# Patient Record
Sex: MALE | Race: WHITE | NOT HISPANIC OR LATINO
[De-identification: names, ages, dates, MRNs, and addresses within clinical notes are randomized per-mention and may not be internally consistent; named-entity substitution may affect disease eponyms.]

---

## 2019-05-17 ENCOUNTER — TRANSCRIPTION ENCOUNTER (OUTPATIENT)
Age: 49
End: 2019-05-17

## 2019-09-23 ENCOUNTER — TRANSCRIPTION ENCOUNTER (OUTPATIENT)
Age: 49
End: 2019-09-23

## 2021-02-19 ENCOUNTER — INPATIENT (INPATIENT)
Facility: HOSPITAL | Age: 51
LOS: 3 days | Discharge: HOME | End: 2021-02-23
Attending: INTERNAL MEDICINE | Admitting: INTERNAL MEDICINE
Payer: COMMERCIAL

## 2021-02-19 VITALS
RESPIRATION RATE: 18 BRPM | DIASTOLIC BLOOD PRESSURE: 99 MMHG | SYSTOLIC BLOOD PRESSURE: 210 MMHG | HEART RATE: 94 BPM | TEMPERATURE: 99 F | WEIGHT: 205.03 LBS | OXYGEN SATURATION: 96 %

## 2021-02-19 DIAGNOSIS — Z95.5 PRESENCE OF CORONARY ANGIOPLASTY IMPLANT AND GRAFT: Chronic | ICD-10-CM

## 2021-02-19 LAB
ALBUMIN SERPL ELPH-MCNC: 4.7 G/DL — SIGNIFICANT CHANGE UP (ref 3.5–5.2)
ALP SERPL-CCNC: 87 U/L — SIGNIFICANT CHANGE UP (ref 30–115)
ALT FLD-CCNC: 23 U/L — SIGNIFICANT CHANGE UP (ref 0–41)
ANION GAP SERPL CALC-SCNC: 13 MMOL/L — SIGNIFICANT CHANGE UP (ref 7–14)
APTT BLD: 33.7 SEC — SIGNIFICANT CHANGE UP (ref 27–39.2)
AST SERPL-CCNC: 19 U/L — SIGNIFICANT CHANGE UP (ref 0–41)
BASOPHILS # BLD AUTO: 0.03 K/UL — SIGNIFICANT CHANGE UP (ref 0–0.2)
BASOPHILS NFR BLD AUTO: 0.3 % — SIGNIFICANT CHANGE UP (ref 0–1)
BILIRUB SERPL-MCNC: 0.8 MG/DL — SIGNIFICANT CHANGE UP (ref 0.2–1.2)
BUN SERPL-MCNC: 14 MG/DL — SIGNIFICANT CHANGE UP (ref 10–20)
CALCIUM SERPL-MCNC: 10.2 MG/DL — HIGH (ref 8.5–10.1)
CHLORIDE SERPL-SCNC: 94 MMOL/L — LOW (ref 98–110)
CO2 SERPL-SCNC: 29 MMOL/L — SIGNIFICANT CHANGE UP (ref 17–32)
CREAT SERPL-MCNC: 0.7 MG/DL — SIGNIFICANT CHANGE UP (ref 0.7–1.5)
EOSINOPHIL # BLD AUTO: 0.04 K/UL — SIGNIFICANT CHANGE UP (ref 0–0.7)
EOSINOPHIL NFR BLD AUTO: 0.5 % — SIGNIFICANT CHANGE UP (ref 0–8)
GLUCOSE BLDC GLUCOMTR-MCNC: 295 MG/DL — HIGH (ref 70–99)
GLUCOSE SERPL-MCNC: 269 MG/DL — HIGH (ref 70–99)
HCT VFR BLD CALC: 47.6 % — SIGNIFICANT CHANGE UP (ref 42–52)
HGB BLD-MCNC: 16.6 G/DL — SIGNIFICANT CHANGE UP (ref 14–18)
IMM GRANULOCYTES NFR BLD AUTO: 0.5 % — HIGH (ref 0.1–0.3)
INR BLD: 0.99 RATIO — SIGNIFICANT CHANGE UP (ref 0.65–1.3)
LYMPHOCYTES # BLD AUTO: 1.38 K/UL — SIGNIFICANT CHANGE UP (ref 1.2–3.4)
LYMPHOCYTES # BLD AUTO: 15.6 % — LOW (ref 20.5–51.1)
MCHC RBC-ENTMCNC: 29.7 PG — SIGNIFICANT CHANGE UP (ref 27–31)
MCHC RBC-ENTMCNC: 34.9 G/DL — SIGNIFICANT CHANGE UP (ref 32–37)
MCV RBC AUTO: 85.3 FL — SIGNIFICANT CHANGE UP (ref 80–94)
MONOCYTES # BLD AUTO: 0.56 K/UL — SIGNIFICANT CHANGE UP (ref 0.1–0.6)
MONOCYTES NFR BLD AUTO: 6.3 % — SIGNIFICANT CHANGE UP (ref 1.7–9.3)
NEUTROPHILS # BLD AUTO: 6.79 K/UL — HIGH (ref 1.4–6.5)
NEUTROPHILS NFR BLD AUTO: 76.8 % — HIGH (ref 42.2–75.2)
NRBC # BLD: 0 /100 WBCS — SIGNIFICANT CHANGE UP (ref 0–0)
NT-PROBNP SERPL-SCNC: 115 PG/ML — SIGNIFICANT CHANGE UP (ref 0–300)
PLATELET # BLD AUTO: 201 K/UL — SIGNIFICANT CHANGE UP (ref 130–400)
POTASSIUM SERPL-MCNC: 4.1 MMOL/L — SIGNIFICANT CHANGE UP (ref 3.5–5)
POTASSIUM SERPL-SCNC: 4.1 MMOL/L — SIGNIFICANT CHANGE UP (ref 3.5–5)
PROT SERPL-MCNC: 7.6 G/DL — SIGNIFICANT CHANGE UP (ref 6–8)
PROTHROM AB SERPL-ACNC: 11.4 SEC — SIGNIFICANT CHANGE UP (ref 9.95–12.87)
RBC # BLD: 5.58 M/UL — SIGNIFICANT CHANGE UP (ref 4.7–6.1)
RBC # FLD: 11.7 % — SIGNIFICANT CHANGE UP (ref 11.5–14.5)
SARS-COV-2 RNA SPEC QL NAA+PROBE: SIGNIFICANT CHANGE UP
SODIUM SERPL-SCNC: 136 MMOL/L — SIGNIFICANT CHANGE UP (ref 135–146)
TROPONIN T SERPL-MCNC: <0.01 NG/ML — SIGNIFICANT CHANGE UP
TROPONIN T SERPL-MCNC: <0.01 NG/ML — SIGNIFICANT CHANGE UP
WBC # BLD: 8.84 K/UL — SIGNIFICANT CHANGE UP (ref 4.8–10.8)
WBC # FLD AUTO: 8.84 K/UL — SIGNIFICANT CHANGE UP (ref 4.8–10.8)

## 2021-02-19 PROCEDURE — 99285 EMERGENCY DEPT VISIT HI MDM: CPT

## 2021-02-19 PROCEDURE — 78452 HT MUSCLE IMAGE SPECT MULT: CPT | Mod: 26

## 2021-02-19 PROCEDURE — 71046 X-RAY EXAM CHEST 2 VIEWS: CPT | Mod: 26

## 2021-02-19 PROCEDURE — 99223 1ST HOSP IP/OBS HIGH 75: CPT | Mod: AI

## 2021-02-19 PROCEDURE — 93010 ELECTROCARDIOGRAM REPORT: CPT

## 2021-02-19 PROCEDURE — 93010 ELECTROCARDIOGRAM REPORT: CPT | Mod: 77

## 2021-02-19 RX ORDER — DEXTROSE 50 % IN WATER 50 %
25 SYRINGE (ML) INTRAVENOUS ONCE
Refills: 0 | Status: DISCONTINUED | OUTPATIENT
Start: 2021-02-19 | End: 2021-02-23

## 2021-02-19 RX ORDER — DEXTROSE 50 % IN WATER 50 %
15 SYRINGE (ML) INTRAVENOUS ONCE
Refills: 0 | Status: DISCONTINUED | OUTPATIENT
Start: 2021-02-19 | End: 2021-02-23

## 2021-02-19 RX ORDER — ENOXAPARIN SODIUM 100 MG/ML
40 INJECTION SUBCUTANEOUS AT BEDTIME
Refills: 0 | Status: DISCONTINUED | OUTPATIENT
Start: 2021-02-19 | End: 2021-02-21

## 2021-02-19 RX ORDER — ASPIRIN/CALCIUM CARB/MAGNESIUM 324 MG
325 TABLET ORAL ONCE
Refills: 0 | Status: COMPLETED | OUTPATIENT
Start: 2021-02-19 | End: 2021-02-19

## 2021-02-19 RX ORDER — INSULIN LISPRO 100/ML
VIAL (ML) SUBCUTANEOUS
Refills: 0 | Status: DISCONTINUED | OUTPATIENT
Start: 2021-02-19 | End: 2021-02-23

## 2021-02-19 RX ORDER — INSULIN LISPRO 100/ML
3 VIAL (ML) SUBCUTANEOUS
Refills: 0 | Status: DISCONTINUED | OUTPATIENT
Start: 2021-02-19 | End: 2021-02-22

## 2021-02-19 RX ORDER — SODIUM CHLORIDE 9 MG/ML
1000 INJECTION, SOLUTION INTRAVENOUS
Refills: 0 | Status: DISCONTINUED | OUTPATIENT
Start: 2021-02-19 | End: 2021-02-23

## 2021-02-19 RX ORDER — CLOPIDOGREL BISULFATE 75 MG/1
75 TABLET, FILM COATED ORAL DAILY
Refills: 0 | Status: DISCONTINUED | OUTPATIENT
Start: 2021-02-19 | End: 2021-02-22

## 2021-02-19 RX ORDER — ATORVASTATIN CALCIUM 80 MG/1
40 TABLET, FILM COATED ORAL DAILY
Refills: 0 | Status: DISCONTINUED | OUTPATIENT
Start: 2021-02-19 | End: 2021-02-23

## 2021-02-19 RX ORDER — DEXTROSE 50 % IN WATER 50 %
12.5 SYRINGE (ML) INTRAVENOUS ONCE
Refills: 0 | Status: DISCONTINUED | OUTPATIENT
Start: 2021-02-19 | End: 2021-02-23

## 2021-02-19 RX ORDER — ASPIRIN/CALCIUM CARB/MAGNESIUM 324 MG
81 TABLET ORAL DAILY
Refills: 0 | Status: DISCONTINUED | OUTPATIENT
Start: 2021-02-19 | End: 2021-02-23

## 2021-02-19 RX ORDER — METOPROLOL TARTRATE 50 MG
25 TABLET ORAL
Refills: 0 | Status: DISCONTINUED | OUTPATIENT
Start: 2021-02-19 | End: 2021-02-23

## 2021-02-19 RX ORDER — INSULIN GLARGINE 100 [IU]/ML
10 INJECTION, SOLUTION SUBCUTANEOUS AT BEDTIME
Refills: 0 | Status: DISCONTINUED | OUTPATIENT
Start: 2021-02-19 | End: 2021-02-21

## 2021-02-19 RX ORDER — PANTOPRAZOLE SODIUM 20 MG/1
40 TABLET, DELAYED RELEASE ORAL
Refills: 0 | Status: DISCONTINUED | OUTPATIENT
Start: 2021-02-19 | End: 2021-02-23

## 2021-02-19 RX ORDER — GLUCAGON INJECTION, SOLUTION 0.5 MG/.1ML
1 INJECTION, SOLUTION SUBCUTANEOUS ONCE
Refills: 0 | Status: DISCONTINUED | OUTPATIENT
Start: 2021-02-19 | End: 2021-02-23

## 2021-02-19 RX ADMIN — Medication 25 MILLIGRAM(S): at 21:32

## 2021-02-19 RX ADMIN — ENOXAPARIN SODIUM 40 MILLIGRAM(S): 100 INJECTION SUBCUTANEOUS at 21:32

## 2021-02-19 RX ADMIN — INSULIN GLARGINE 10 UNIT(S): 100 INJECTION, SOLUTION SUBCUTANEOUS at 21:33

## 2021-02-19 RX ADMIN — Medication 325 MILLIGRAM(S): at 15:19

## 2021-02-19 RX ADMIN — ATORVASTATIN CALCIUM 40 MILLIGRAM(S): 80 TABLET, FILM COATED ORAL at 21:33

## 2021-02-19 RX ADMIN — PANTOPRAZOLE SODIUM 40 MILLIGRAM(S): 20 TABLET, DELAYED RELEASE ORAL at 21:41

## 2021-02-19 NOTE — H&P ADULT - NSHPLABSRESULTS_GEN_ALL_CORE
16.6   8.84  )-----------( 201      ( 19 Feb 2021 14:28 )             47.6       02-19    136  |  94<L>  |  14  ----------------------------<  269<H>  4.1   |  29  |  0.7    Ca    10.2<H>      19 Feb 2021 14:28    TPro  7.6  /  Alb  4.7  /  TBili  0.8  /  DBili  x   /  AST  19  /  ALT  23  /  AlkPhos  87  02-19

## 2021-02-19 NOTE — SBIRT NOTE ADULT - NSSBIRTBRIEFINTDET_GEN_A_CORE
Provided SBIRT services: Full screen positive. Referral to Treatment attempted. Screening results were reviewed with the patient and patient was provided information about healthy guidelines and potential negative consequences associated with level of risk. Motivation and readiness to reduce or stop use was discussed and goals and activities to make changes were suggested/offered. Options discussed for further evaluation and treatment, but referral to treatment was not completed because patient wants to cut down/quit on his own. Pt offered but declined a referral for inpatient/outpatient rehab services.

## 2021-02-19 NOTE — ED PROVIDER NOTE - PHYSICAL EXAMINATION
CONSTITUTIONAL: well developed, nontoxic appearing, in no acute distress, speaking in full sentences  SKIN: warm, dry, no rash, cap refill < 2 seconds  HEENT: normocephalic, atraumatic, no conjunctival erythema, moist mucous membranes, patent airway  NECK: supple  CV:  regular rate, regular rhythm, 2+ radial pulses bilaterally  RESP: no wheezes, no rales, no rhonchi, normal work of breathing  ABD: soft, nontender, nondistended, no rebound, no guarding  MSK: normal ROM, no cyanosis, no edema  NEURO: alert, oriented, grossly unremarkable  PSYCH: cooperative, appropriate

## 2021-02-19 NOTE — ED ADULT NURSE NOTE - NSSUHOSCREENINGYN_ED_ALL_ED
Anesthesia Evaluation     .             ROS/MED HX    ENT/Pulmonary:     (+)sleep apnea, uses CPAP , . .   (-) tobacco use   Neurologic:     (+)migraines, CVA date: 2/2017 without deficits    Cardiovascular:     (+) hypertension----. : . . . :. . Previous cardiac testing Echodate:results:Interpretation Summary     A patent foramen ovale is present.  Atrial bi-directional shunt  A contrast injection (Bubble Study) was performed that was mildly positive for  flow across the interatrial septum.  The ascending aorta is Mildly dilated.  The atrial septum is aneurysmal.  The study was technically difficult.  _____________________________________________________________________________  __        BROCK  Versed (6mg) was given intravenously. Fentanyl (100mcg) was given  intravenously. Total sedation time: 21 minutes of continuous bedside 1:1  monitoring. Consent to the procedure was obtained prior to sedation. Prior to  the exam, the oral cavity was checked and no overcrowding was noted. The  transesophageal probe was passed without difficulty. There were no  complications associated with this procedure.     Left Ventricle  The left ventricle is normal in size. The visual ejection fraction is  estimated at 55-60%. Left ventricular systolic function is normal.     Right Ventricle  The right ventricle is normal in size and function.     Atria  The left atrium is mildly dilated. No thrombus is detected in the left atrial  appendage. No left atrial mass or thrombus visualized. Right atrial size is  normal. No evidence of right atrial mass/thrombus. Lipomatous hypertrophy of  the interatrial septum is noted. Left to right atrial shunt, small. Right to  left atrial shunt, small. A contrast injection (Bubble Study) was performed  that was mildly positive for flow across the interatrial septum. A contrast  injection (Bubble Study) was performed that was positive early after the  injection. A patent foramen ovale is present. Atrial  bi-directional shunt. The  atrial septum is aneurysmal.        Mitral Valve  There is trace mitral regurgitation.     Tricuspid Valve  There is mild (1+) tricuspid regurgitation.     Aortic Valve  The aortic valve is trileaflet. There is trace aortic regurgitation. No aortic  stenosis is present.     Pulmonic Valve  There is no pulmonic valvular regurgitation. Normal pulmonic valve velocity.     Vessels  The ascending aorta is Mildly dilated. Normal pulmonary venous drainage.        Pericardial/Pleural  There is no pericardial effusion.     Rhythm  The rhythm was sinus bradycardia.  date: results: date: results: date: results:          METS/Exercise Tolerance:     Hematologic:         Musculoskeletal:         GI/Hepatic:        (-) GERD   Renal/Genitourinary:         Endo:     (+) Obesity, .      Psychiatric:         Infectious Disease:         Malignancy:         Other:                     Physical Exam  Normal systems: cardiovascular, pulmonary and dental    Airway   Mallampati: III  TM distance: >3 FB  Neck ROM: full    Dental     Cardiovascular       Pulmonary                     Anesthesia Plan      History & Physical Review  History and physical reviewed and following examination; no interval change.    ASA Status:  2 .    NPO Status:  > 8 hours    Plan for ETT and General with Intravenous and Propofol induction. Maintenance will be Inhalation.    PONV prophylaxis:  Ondansetron (or other 5HT-3) and Dexamethasone or Solumedrol  Additional equipment: Videolaryngoscope 12.5mg Beandryl, 4mg Decadron and Zofran for PONV Prophy      Postoperative Care  Postoperative pain management:  IV analgesics and Oral pain medications.      Consents  Anesthetic plan, risks, benefits and alternatives discussed with:  Patient..                          .   Yes - the patient is able to be screened

## 2021-02-19 NOTE — H&P ADULT - ASSESSMENT
# chest pain - atypical in nature  DDx:  EKG on admit: no ST changes, Troponin negative x1  - trend cardiac enzymes  - Pain control  - if uptreading CE or change in nature of chest pain or new EKG changes then cardio eval   - s/p loading dose aspirin. Continue aspirin + plavix    # CAD s/p PCI  - on DAPT, BB, statin    #Misc  - DVT Prophylaxis: lovenox  - GI Prophylaxis: pantoprazole 40mg PO QD   - Diet: DASH  - Activity: as tolerated  - Code Status: Full Code    SOCIAL: patient lives alone, walks independently, comfortable with ADLs.    * MED REC COMPLETED WITH PATIENT*         # chest pain - atypical in nature  DDx:  EKG on admit: no ST changes, Troponin negative x1  - trend cardiac enzymes  - Pain control  - Cardiology consult  - s/p loading dose aspirin. Continue aspirin + plavix    # CAD s/p PCI  - on DAPT, BB, statin    #Misc  - DVT Prophylaxis: lovenox  - GI Prophylaxis: pantoprazole 40mg PO QD   - Diet: DASH  - Activity: as tolerated  - Code Status: Full Code    SOCIAL: patient lives alone, walks independently, comfortable with ADLs.    * MED REC COMPLETED WITH PATIENT*   51 year old male  with a PMhx of CAD s/p PCIx2 (last was 10 years ago) on ASA/plavix, HTN, DMII  # chest pain   DDx:  EKG on admit: no ST changes, Troponin negative x1  - trend cardiac enzymes  - Pain control  - Cardiology consult  - s/p loading dose aspirin. Continue aspirin + plavix    # CAD s/p PCI  - on DAPT, BB, statin    #Misc  - DVT Prophylaxis: lovenox  - GI Prophylaxis: pantoprazole 40mg PO QD   - Diet: DASH  - Activity: as tolerated  - Code Status: Full Code    SOCIAL: patient lives alone, walks independently, comfortable with ADLs.    * MED REC COMPLETED WITH PATIENT*   51 year old male  with a PMhx of CAD s/p PCIx2 (last was 10 years ago) on ASA/plavix, HTN, DMII    # chest pain - unstable angina vs GERD  However exacerbated by ambulation   EKG on admit: no ST changes, Troponin negative x1  - trend cardiac enzymes  - Pain control  - Cardiology consult in AM (dr. rutherford)  - will order stress test (patient says he had a stress test 1.5 years ago which was normal)  - s/p loading dose aspirin. Continue aspirin + plavix    # CAD s/p PCI  - on DAPT, BB, statin    # DMII  - Lantus 10U  and lispro 3U TID  - target -180  - f.u hba1c in AM     #Misc  - DVT Prophylaxis: lovenox  - GI Prophylaxis: pantoprazole 40mg PO QD   - Diet: DASH  - Activity: as tolerated  - Code Status: Full Code    SOCIAL: patient  walks independently, comfortable with ADLs.    * MED REC COMPLETED WITH PATIENT*   51 year old male  with a PMhx of CAD s/p PCIx2 (last was 10 years ago) on ASA/plavix, HTN, DMII    # chest pain - unstable angina vs GERD  However exacerbated by ambulation   EKG on admit: no ST changes, Troponin negative x1  - trend cardiac enzymes  - Cardiology consult in AM (dr. rutherford)  - will order stress test (patient says he had a stress test 1.5 years ago which was normal)  - s/p loading dose aspirin. Continue aspirin + plavix  - ekg in AM    # CAD s/p PCI  - on DAPT, BB, statin    # DMII  - Lantus 10U  and lispro 3U TID  - target -180  - f.u hba1c in AM     #Misc  - DVT Prophylaxis: lovenox  - GI Prophylaxis: pantoprazole 40mg PO QD   - Diet: DASH  - Activity: as tolerated  - Code Status: Full Code    SOCIAL: patient  walks independently, comfortable with ADLs.    * MED REC COMPLETED WITH PATIENT*

## 2021-02-19 NOTE — H&P ADULT - ATTENDING COMMENTS
I have performed a history and physical exam of this patient and discussed the management with the resident.  I have reviewed the resident note and agree with the documented findings and plan of care.

## 2021-02-19 NOTE — H&P ADULT - HISTORY OF PRESENT ILLNESS
51 year old male  with a PMhx of CAD s/p PCIx2 on ASA/plavix,     CC:    History goes back to:     ROS is positive for:     ROS is negative for:     Of note:    In the ED: VS significant BP:210/100  Labs significant for: WNL  Troponin: negative x1  EKG:   CXR:   CT head:  51 year old male  with a PMhx of CAD s/p PCIx2 on ASA/plavix,     CC:    History goes back to:     ROS is positive for:     ROS is negative for:     Of note:    In the ED: VS significant BP:210/100  Labs significant for: WNL  Troponin: negative x1  EKG: no acute St changes  CXR: WNL  CT head:  51 year old male  with a PMhx of CAD s/p PCIx2 (last was 10 years ago) on ASA/plavix, HTN, DMII    CC: chest pain     History goes back to: 1 week ago when patient started to have epigastric chest pain, 6/10- 8/10, radiating to his upper chest. Patient reports that pain in intermittent and exacerbated by  walking, relived by resting.   Pain is not associated with food intake, not exacerbated by heavy meals. No associated SOB with chest pain.     ROS is negative for: no fever, no chills, no nausea or vomiting. Patient denies change in urinary or bowel habits. No lower extremity edema/swelling. No anosmia or ageusia.     In the ED: VS significant BP:210/100  Labs significant for: WNL  Troponin: negative x1  EKG: no acute St changes  CXR: WNL  CT head:  51 year old male  with a PMhx of CAD s/p PCIx2 (last was 10 years ago) on ASA/plavix, HTN, DMII    CC: chest pain     History goes back to: 1 week ago when patient started to have epigastric chest pain, 6/10- 8/10, radiating to his upper chest. Patient reports that pain in intermittent and exacerbated by  walking, relived by resting.   Pain is not associated with food intake, not exacerbated by heavy meals. No associated SOB with chest pain.     ROS is negative for: no fever, no chills, no nausea or vomiting. Patient denies change in urinary or bowel habits. No lower extremity edema/swelling. No anosmia or ageusia.     In the ED: VS significant BP:210/100  Labs significant for: WNL  Troponin: negative x1  EKG: no acute St changes  CXR: WNL

## 2021-02-19 NOTE — ED ADULT NURSE NOTE - NSIMPLEMENTINTERV_GEN_ALL_ED
Implemented All Universal Safety Interventions:  [place] to call system. Call bell, personal items and telephone within reach. Instruct patient to call for assistance. Room bathroom lighting operational. Non-slip footwear when patient is off stretcher. Physically safe environment: no spills, clutter or unnecessary equipment. Stretcher in lowest position, wheels locked, appropriate side rails in place.

## 2021-02-19 NOTE — ED PROVIDER NOTE - OBJECTIVE STATEMENT
52 yo M with PMHx of CAD s/p PCIx2 on ASA/plavix, DM, HLD, HTN who presents with intermittent     Cardio: Dr. Rubio 50 yo M with PMHx of CAD s/p PCIx2 on ASA/plavix, DM, HLD, HTN who presents with intermittent, moderate, L sided cp radiating to LUQ/LUE x 1 wk associated with palpitations. Sx occur with exertion, improved with rest. No fever, cough, sob, nausea, vomiting, leg swelling/pain, hx of clots, hormone use. States sx feel similar to when he had PCIx2 placed 10 yrs ago. Had stress test ~1.5 yrs ago.    Cardio: Dr. Rubio

## 2021-02-19 NOTE — ED PROVIDER NOTE - CLINICAL SUMMARY MEDICAL DECISION MAKING FREE TEXT BOX
50 yo male PMH HTN, [id number] cholesterol,  CAD with 2 stents for 10 years c/o sharp intermittent exertional left sided CP radiating to his left arm for about 6 days.  No associated syncope, dizziness, lightheadedness, cough, abdominal pain, black or bloody stools, no leg pain or swelling.  No recent illness  or travel, he reports compliance with meds, does not have a cardiologist.  No pain at present.   Well-appearing well-nourished, NAD, head AT/NC, PERRL, pink conjunctivae,  nml phonation , supple neck without midline spine ttp, nml work of breathing, lungs CTA b/l, equal air entry, RRR, well-perfused extremities, distal pulses intact, abdomen soft, NT/ND, BS present in all quadrants, no midline spine or CVA ttp, no leg edema or unilateral calf swelling, A&Ox3, no focal neuro deficits, nml mood and affect.  ECG is without acute ischemic changes, CRX WNL.  Plan admission for further work up .  Patient is amenable with the plan.

## 2021-02-19 NOTE — ED PROVIDER NOTE - PROGRESS NOTE DETAILS
TC: 52 yo M TC: 50 yo M with PMHx of CAD s/p PCIx2 on ASA/plavix, DM, HLD, HTN who presents with exertional cp x 1 wk. Had "normal" stress test 1.5 yrs ago. Here in ED, vitals wnl. Ordered labs, ekg, cxr. Will reassess. TC: Labs wnl including negative trop. Ekg nonsichemic. Cxr negative. HEART score 4 (1+ hx, 1+ age, 2+ risk factors). KELLY score 4. Given ASA 325mg. Pt without active cp at this time.

## 2021-02-19 NOTE — ED PROVIDER NOTE - NS ED ROS FT
GEN:  no fever, no chills, no generalized weakness  NEURO:  no headache, no dizziness  ENT: no sore throat, no runny nose  CV:  + chest pain, + palpitations  RESP:  no sob, no cough  GI:  no nausea, no vomiting, no abdominal pain, no diarrhea  :  no dysuria, no urinary frequency, no hematuria  MSK:  no joint pain, no edema  SKIN:  no rash, no bruising  HEME: no hematochezia, no melena

## 2021-02-19 NOTE — ED ADULT TRIAGE NOTE - NS ED TRIAGE EKG FT
Detail Level: Zone Recommendation Preamble: The following recommendations were made during the visit: Dr. Farooq’s wart pads and 40% Mediplast by Pb Dr Kaur

## 2021-02-19 NOTE — H&P ADULT - NSHPPHYSICALEXAM_GEN_ALL_CORE
Physical Exam:  General: NAD,   Neurology: A&Ox3, nonfocal, follows commands  Eyes: PERRLA/ EOMI  ENT/Neck: Neck supple, trachea midline, No JVD  Respiratory: B/L basilar rales, No wheezing, rhonchi  Cardiovascular: Normal rate regular rhythm, S1S2, no murmurs, rubs or gallops  Abdominal: Soft, non-tender, non-distended  Extremities: no pedal edema, + peripheral pulses  Musculoskeletal: 5/5 BUE and BLE muscle strength  Skin: No Rashes, Hematoma, Ecchymosis

## 2021-02-19 NOTE — ED PROVIDER NOTE - ATTENDING CONTRIBUTION TO CARE
50 yo male PMH HTN, [id number] cholesterol,  CAD with 2 stents for 10 years c/o sharp intermittent exertional left sided CP radiating to his left arm for about 6 days.  No associated syncope, dizziness, lightheadedness, cough, abdominal pain, black or bloody stools, no leg pain or swelling.  No recent illness  or travel, he reports compliance with meds, does not have a cardiologist.  No pain at present.   Well-appearing well-nourished, NAD, head AT/NC, PERRL, pink conjunctivae,  nml phonation , supple neck without midline spine ttp, nml work of breathing, lungs CTA b/l, equal air entry, RRR, well-perfused extremities, distal pulses intact, abdomen soft, NT/ND, BS present in all quadrants, no midline spine or CVA ttp, no leg edema or unilateral calf swelling, A&Ox3, no focal neuro deficits, nml mood and affect.  ECG is without acute ischemic changes, CRX WNL.  Plan admission for further work up .  Patient is amenable with the plan.

## 2021-02-20 DIAGNOSIS — E78.00 PURE HYPERCHOLESTEROLEMIA, UNSPECIFIED: ICD-10-CM

## 2021-02-20 DIAGNOSIS — R07.9 CHEST PAIN, UNSPECIFIED: ICD-10-CM

## 2021-02-20 DIAGNOSIS — I10 ESSENTIAL (PRIMARY) HYPERTENSION: ICD-10-CM

## 2021-02-20 DIAGNOSIS — E11.9 TYPE 2 DIABETES MELLITUS WITHOUT COMPLICATIONS: ICD-10-CM

## 2021-02-20 LAB
A1C WITH ESTIMATED AVERAGE GLUCOSE RESULT: 9.3 % — HIGH (ref 4–5.6)
ALBUMIN SERPL ELPH-MCNC: 4 G/DL — SIGNIFICANT CHANGE UP (ref 3.5–5.2)
ALP SERPL-CCNC: 74 U/L — SIGNIFICANT CHANGE UP (ref 30–115)
ALT FLD-CCNC: 18 U/L — SIGNIFICANT CHANGE UP (ref 0–41)
ANION GAP SERPL CALC-SCNC: 10 MMOL/L — SIGNIFICANT CHANGE UP (ref 7–14)
AST SERPL-CCNC: 14 U/L — SIGNIFICANT CHANGE UP (ref 0–41)
BASOPHILS # BLD AUTO: 0.03 K/UL — SIGNIFICANT CHANGE UP (ref 0–0.2)
BASOPHILS NFR BLD AUTO: 0.4 % — SIGNIFICANT CHANGE UP (ref 0–1)
BILIRUB SERPL-MCNC: 0.6 MG/DL — SIGNIFICANT CHANGE UP (ref 0.2–1.2)
BUN SERPL-MCNC: 17 MG/DL — SIGNIFICANT CHANGE UP (ref 10–20)
CALCIUM SERPL-MCNC: 9.2 MG/DL — SIGNIFICANT CHANGE UP (ref 8.5–10.1)
CHLORIDE SERPL-SCNC: 101 MMOL/L — SIGNIFICANT CHANGE UP (ref 98–110)
CHOLEST SERPL-MCNC: 116 MG/DL — SIGNIFICANT CHANGE UP
CO2 SERPL-SCNC: 27 MMOL/L — SIGNIFICANT CHANGE UP (ref 17–32)
CREAT SERPL-MCNC: 0.8 MG/DL — SIGNIFICANT CHANGE UP (ref 0.7–1.5)
EOSINOPHIL # BLD AUTO: 0.1 K/UL — SIGNIFICANT CHANGE UP (ref 0–0.7)
EOSINOPHIL NFR BLD AUTO: 1.3 % — SIGNIFICANT CHANGE UP (ref 0–8)
ESTIMATED AVERAGE GLUCOSE: 220 MG/DL — HIGH (ref 68–114)
GLUCOSE BLDC GLUCOMTR-MCNC: 226 MG/DL — HIGH (ref 70–99)
GLUCOSE BLDC GLUCOMTR-MCNC: 241 MG/DL — HIGH (ref 70–99)
GLUCOSE BLDC GLUCOMTR-MCNC: 278 MG/DL — HIGH (ref 70–99)
GLUCOSE BLDC GLUCOMTR-MCNC: 298 MG/DL — HIGH (ref 70–99)
GLUCOSE SERPL-MCNC: 186 MG/DL — HIGH (ref 70–99)
HCT VFR BLD CALC: 45 % — SIGNIFICANT CHANGE UP (ref 42–52)
HDLC SERPL-MCNC: 31 MG/DL — LOW
HGB BLD-MCNC: 15.4 G/DL — SIGNIFICANT CHANGE UP (ref 14–18)
IMM GRANULOCYTES NFR BLD AUTO: 0.4 % — HIGH (ref 0.1–0.3)
LIPID PNL WITH DIRECT LDL SERPL: 72 MG/DL — SIGNIFICANT CHANGE UP
LYMPHOCYTES # BLD AUTO: 2.55 K/UL — SIGNIFICANT CHANGE UP (ref 1.2–3.4)
LYMPHOCYTES # BLD AUTO: 33.1 % — SIGNIFICANT CHANGE UP (ref 20.5–51.1)
MAGNESIUM SERPL-MCNC: 1.8 MG/DL — SIGNIFICANT CHANGE UP (ref 1.8–2.4)
MCHC RBC-ENTMCNC: 29.5 PG — SIGNIFICANT CHANGE UP (ref 27–31)
MCHC RBC-ENTMCNC: 34.2 G/DL — SIGNIFICANT CHANGE UP (ref 32–37)
MCV RBC AUTO: 86.2 FL — SIGNIFICANT CHANGE UP (ref 80–94)
MONOCYTES # BLD AUTO: 0.7 K/UL — HIGH (ref 0.1–0.6)
MONOCYTES NFR BLD AUTO: 9.1 % — SIGNIFICANT CHANGE UP (ref 1.7–9.3)
NEUTROPHILS # BLD AUTO: 4.29 K/UL — SIGNIFICANT CHANGE UP (ref 1.4–6.5)
NEUTROPHILS NFR BLD AUTO: 55.7 % — SIGNIFICANT CHANGE UP (ref 42.2–75.2)
NON HDL CHOLESTEROL: 85 MG/DL — SIGNIFICANT CHANGE UP
NRBC # BLD: 0 /100 WBCS — SIGNIFICANT CHANGE UP (ref 0–0)
PLATELET # BLD AUTO: 174 K/UL — SIGNIFICANT CHANGE UP (ref 130–400)
POTASSIUM SERPL-MCNC: 4.1 MMOL/L — SIGNIFICANT CHANGE UP (ref 3.5–5)
POTASSIUM SERPL-SCNC: 4.1 MMOL/L — SIGNIFICANT CHANGE UP (ref 3.5–5)
PROT SERPL-MCNC: 6.7 G/DL — SIGNIFICANT CHANGE UP (ref 6–8)
RBC # BLD: 5.22 M/UL — SIGNIFICANT CHANGE UP (ref 4.7–6.1)
RBC # FLD: 11.7 % — SIGNIFICANT CHANGE UP (ref 11.5–14.5)
SODIUM SERPL-SCNC: 138 MMOL/L — SIGNIFICANT CHANGE UP (ref 135–146)
TRIGL SERPL-MCNC: 175 MG/DL — HIGH
TROPONIN T SERPL-MCNC: <0.01 NG/ML — SIGNIFICANT CHANGE UP
WBC # BLD: 7.7 K/UL — SIGNIFICANT CHANGE UP (ref 4.8–10.8)
WBC # FLD AUTO: 7.7 K/UL — SIGNIFICANT CHANGE UP (ref 4.8–10.8)

## 2021-02-20 PROCEDURE — 99233 SBSQ HOSP IP/OBS HIGH 50: CPT

## 2021-02-20 PROCEDURE — 93016 CV STRESS TEST SUPVJ ONLY: CPT

## 2021-02-20 PROCEDURE — 93018 CV STRESS TEST I&R ONLY: CPT

## 2021-02-20 RX ORDER — REGADENOSON 0.08 MG/ML
0.4 INJECTION, SOLUTION INTRAVENOUS ONCE
Refills: 0 | Status: DISCONTINUED | OUTPATIENT
Start: 2021-02-20 | End: 2021-02-20

## 2021-02-20 RX ORDER — REGADENOSON 0.08 MG/ML
0.4 INJECTION, SOLUTION INTRAVENOUS ONCE
Refills: 0 | Status: DISCONTINUED | OUTPATIENT
Start: 2021-02-20 | End: 2021-02-23

## 2021-02-20 RX ADMIN — ATORVASTATIN CALCIUM 40 MILLIGRAM(S): 80 TABLET, FILM COATED ORAL at 11:50

## 2021-02-20 RX ADMIN — Medication 25 MILLIGRAM(S): at 06:03

## 2021-02-20 RX ADMIN — CLOPIDOGREL BISULFATE 75 MILLIGRAM(S): 75 TABLET, FILM COATED ORAL at 11:50

## 2021-02-20 RX ADMIN — Medication 3: at 17:17

## 2021-02-20 RX ADMIN — Medication 25 MILLIGRAM(S): at 17:18

## 2021-02-20 RX ADMIN — INSULIN GLARGINE 10 UNIT(S): 100 INJECTION, SOLUTION SUBCUTANEOUS at 22:24

## 2021-02-20 RX ADMIN — Medication 3 UNIT(S): at 11:47

## 2021-02-20 RX ADMIN — Medication 3 UNIT(S): at 17:17

## 2021-02-20 RX ADMIN — ENOXAPARIN SODIUM 40 MILLIGRAM(S): 100 INJECTION SUBCUTANEOUS at 22:25

## 2021-02-20 RX ADMIN — Medication 81 MILLIGRAM(S): at 11:49

## 2021-02-20 RX ADMIN — Medication 2: at 11:49

## 2021-02-20 NOTE — CONSULT NOTE ADULT - PROBLEM SELECTOR RECOMMENDATION 9
pt with large area of ischemia with nl ef on lexiscan.  Will discuss with DR. Rubio for possible cardiac cath on Monday  cont asa/plavix/metoprolol  and statin  after cath consider ace/arb with dm and cad  if has more sxs then therapeutic lovenox and upgrade to ccu until then maintain on telemetry

## 2021-02-20 NOTE — CONSULT NOTE ADULT - SUBJECTIVE AND OBJECTIVE BOX
Patient is a 51y old  Male who presents with a chief complaint of chest pain (20 Feb 2021 12:59)      HPI:  51 year old male  with a PMhx of CAD s/p PCIx2 (last was 10 years ago) on ASA/plavix, HTN, DMII    CC: chest pain     History goes back to: 1 week ago when patient started to have epigastric chest pain, 6/10- 8/10, radiating to his upper chest. Patient reports that pain in intermittent and exacerbated by  walking, relived by resting.   Pain is not associated with food intake, not exacerbated by heavy meals. No associated SOB with chest pain.     ROS is negative for: no fever, no chills, no nausea or vomiting. Patient denies change in urinary or bowel habits. No lower extremity edema/swelling. No anosmia or ageusia.     In the ED: VS significant BP:210/100  Labs significant for: WNL  Troponin: negative x3  EKG: no acute St changes  CXR: WNL   (19 Feb 2021 17:33)      PAST MEDICAL & SURGICAL HISTORY:  High cholesterol    HTN (hypertension)    DM (diabetes mellitus)    CAD (coronary artery disease)    S/P coronary artery stent placement        PREVIOUS DIAGNOSTIC TESTING:      STRESS TEST  FINDINGS:Impression:  1. Large-size reversible defect in the apex and anteroseptal wall of the left ventricle consistent with ischemia.    2. Normal left ventricular wall motion and wall thickening.    3. Left ventricular ejection fraction calculated is 54% which is within the range of normal          MEDICATIONS  (STANDING):  aspirin  chewable 81 milliGRAM(s) Oral daily  atorvastatin Oral Tab/Cap - Peds 40 milliGRAM(s) Oral daily  clopidogrel Tablet 75 milliGRAM(s) Oral daily  dextrose 40% Gel 15 Gram(s) Oral once  dextrose 5%. 1000 milliLiter(s) (50 mL/Hr) IV Continuous <Continuous>  dextrose 5%. 1000 milliLiter(s) (100 mL/Hr) IV Continuous <Continuous>  dextrose 50% Injectable 25 Gram(s) IV Push once  dextrose 50% Injectable 12.5 Gram(s) IV Push once  dextrose 50% Injectable 25 Gram(s) IV Push once  enoxaparin Injectable 40 milliGRAM(s) SubCutaneous at bedtime  glucagon  Injectable 1 milliGRAM(s) IntraMuscular once  insulin glargine Injectable (LANTUS) 10 Unit(s) SubCutaneous at bedtime  insulin lispro (ADMELOG) corrective regimen sliding scale   SubCutaneous three times a day before meals  insulin lispro Injectable (ADMELOG) 3 Unit(s) SubCutaneous three times a day before meals  metoprolol tartrate 25 milliGRAM(s) Oral two times a day  pantoprazole    Tablet 40 milliGRAM(s) Oral before breakfast  regadenoson Injectable 0.4 milliGRAM(s) IV Push once    MEDICATIONS  (PRN):  aluminum hydroxide/magnesium hydroxide/simethicone Suspension 30 milliLiter(s) Oral every 6 hours PRN Dyspepsia      FAMILY HISTORY: non contributory      SOCIAL HISTORY:  CIGARETTES:  ALCOHOL:  DRUGS:                      REVIEW OF SYSTEMS:  CONSTITUTIONAL: No distress, Looks stable  NECK: No pain or stiffness  RESPIRATORY: No cough, wheezing, shortness of breath  CARDIOVASCULAR: No chest pain, SOB, palpitations, leg swelling  GASTROINTESTINAL: No abdominal or epigastric pain. No nausea, vomiting, or hematemesis;  No melena.  NEUROLOGICAL: No dizziness, headaches, memory loss, loss of strength  SKIN: No itching, burning, rashes, or lesions   ENDOCRINE: No heat or cold intolerance  MUSCULOSKELETAL: No joint pain, No  swelling; No muscle pain  PSYCHIATRIC: No depression, anxiety, mood swings, or difficulty sleeping  ALLERGY: No hives, itching, rash          Vital Signs Last 24 Hrs  T(C): 36.7 (20 Feb 2021 05:18), Max: 36.7 (20 Feb 2021 05:18)  T(F): 98 (20 Feb 2021 05:18), Max: 98 (20 Feb 2021 05:18)  HR: 70 (20 Feb 2021 05:18) (70 - 84)  BP: 133/62 (20 Feb 2021 05:18) (124/76 - 138/82)  BP(mean): --  RR: 18 (20 Feb 2021 05:18) (18 - 19)  SpO2: 98% (19 Feb 2021 23:48) (98% - 98%)                      PHYSICAL EXAM:  GENERAL: No distress, well developed  HEAD:  Atraumatic, Normocephalic  NECK: Supple, No JVD, No Bruit of either carotid arteries  NERVOUS SYSTEM:  Alert, Awake, Oriented to time, place, person; Normal memory and speech; Normal motor Strength 5/5 B/L upper and lower extremities  CHEST/LUNG: Normal air entry to lung base bilaterally; No wheeze, crackle, rales, rhonchi  HEART: Regular heart beat, S1, A2, P2, No S3, No S4, No gallop, No murmur  ABDOMEN: Soft, Non tender, Non distended; Bowel sounds present  EXTREMITIES:  2+ Peripheral Pulses, No clubbing, No edema  SKIN: No rashes or lesions    TELEMETRY: nsr    ECG:  Diagnosis Line Normal sinus rhythm  Poor R wave progression  Normal ECG    CxrayImpression:    No radiographic evidence of acute cardiopulmonary disease.    I&O's Detail    19 Feb 2021 07:01  -  20 Feb 2021 07:00  --------------------------------------------------------  IN:    Oral Fluid: 50 mL  Total IN: 50 mL    OUT:  Total OUT: 0 mL    Total NET: 50 mL      20 Feb 2021 07:01  -  20 Feb 2021 13:22  --------------------------------------------------------  IN:    Oral Fluid: 340 mL  Total IN: 340 mL    OUT:    Voided (mL): 300 mL  Total OUT: 300 mL    Total NET: 40 mL          LABS:                        15.4   7.70  )-----------( 174      ( 20 Feb 2021 05:05 )             45.0     02-20    138  |  101  |  17  ----------------------------<  186<H>  4.1   |  27  |  0.8    Ca    9.2      20 Feb 2021 05:05  Mg     1.8     02-20    TPro  6.7  /  Alb  4.0  /  TBili  0.6  /  DBili  x   /  AST  14  /  ALT  18  /  AlkPhos  74  02-20    CARDIAC MARKERS ( 20 Feb 2021 05:05 )  x     / <0.01 ng/mL / x     / x     / x      CARDIAC MARKERS ( 19 Feb 2021 21:39 )  x     / <0.01 ng/mL / x     / x     / x      CARDIAC MARKERS ( 19 Feb 2021 14:28 )  x     / <0.01 ng/mL / x     / x     / x          PT/INR - ( 19 Feb 2021 14:28 )   PT: 11.40 sec;   INR: 0.99 ratio         PTT - ( 19 Feb 2021 14:28 )  PTT:33.7 sec    I&O's Summary    19 Feb 2021 07:01  -  20 Feb 2021 07:00  --------------------------------------------------------  IN: 50 mL / OUT: 0 mL / NET: 50 mL    20 Feb 2021 07:01  -  20 Feb 2021 13:22  --------------------------------------------------------  IN: 340 mL / OUT: 300 mL / NET: 40 mL        RADIOLOGY & ADDITIONAL STUDIES:

## 2021-02-20 NOTE — PROGRESS NOTE ADULT - SUBJECTIVE AND OBJECTIVE BOX
INTERVAL HPI/OVERNIGHT EVENTS:    SUBJECTIVE: Patient seen and examined at bedside.     no sob, abd pain, fever  cp midsternal radiating to L chest/ shoulder, worsen with exertion, lasting 30 min, occurs randomly    OBJECTIVE:    VITAL SIGNS:  Vital Signs Last 24 Hrs  T(C): 36.7 (20 Feb 2021 05:18), Max: 36.7 (20 Feb 2021 05:18)  T(F): 98 (20 Feb 2021 05:18), Max: 98 (20 Feb 2021 05:18)  HR: 70 (20 Feb 2021 05:18) (70 - 84)  BP: 133/62 (20 Feb 2021 05:18) (124/76 - 138/82)  BP(mean): --  RR: 18 (20 Feb 2021 05:18) (18 - 19)  SpO2: 98% (19 Feb 2021 23:48) (98% - 98%)      PHYSICAL EXAM:    General: NAD  HEENT: NC/AT; PERRL, clear conjunctiva  Neck: supple  Respiratory: CTA b/l  Cardiovascular: +S1/S2; RRR  Abdomen: soft, NT/ND; +BS x4  Extremities: WWP, 2+ peripheral pulses b/l; no LE edema  Skin: normal color and turgor; no rash  Neurological:    MEDICATIONS:  MEDICATIONS  (STANDING):  aspirin  chewable 81 milliGRAM(s) Oral daily  atorvastatin Oral Tab/Cap - Peds 40 milliGRAM(s) Oral daily  clopidogrel Tablet 75 milliGRAM(s) Oral daily  dextrose 40% Gel 15 Gram(s) Oral once  dextrose 5%. 1000 milliLiter(s) (50 mL/Hr) IV Continuous <Continuous>  dextrose 5%. 1000 milliLiter(s) (100 mL/Hr) IV Continuous <Continuous>  dextrose 50% Injectable 25 Gram(s) IV Push once  dextrose 50% Injectable 12.5 Gram(s) IV Push once  dextrose 50% Injectable 25 Gram(s) IV Push once  enoxaparin Injectable 40 milliGRAM(s) SubCutaneous at bedtime  glucagon  Injectable 1 milliGRAM(s) IntraMuscular once  insulin glargine Injectable (LANTUS) 10 Unit(s) SubCutaneous at bedtime  insulin lispro (ADMELOG) corrective regimen sliding scale   SubCutaneous three times a day before meals  insulin lispro Injectable (ADMELOG) 3 Unit(s) SubCutaneous three times a day before meals  metoprolol tartrate 25 milliGRAM(s) Oral two times a day  pantoprazole    Tablet 40 milliGRAM(s) Oral before breakfast  regadenoson Injectable 0.4 milliGRAM(s) IV Push once    MEDICATIONS  (PRN):  aluminum hydroxide/magnesium hydroxide/simethicone Suspension 30 milliLiter(s) Oral every 6 hours PRN Dyspepsia      ALLERGIES:  Allergies    Brilinta (Short breath)    Intolerances        LABS:                        15.4   7.70  )-----------( 174      ( 20 Feb 2021 05:05 )             45.0     Hemoglobin: 15.4 g/dL (02-20 @ 05:05)  Hemoglobin: 16.6 g/dL (02-19 @ 14:28)    CBC Full  -  ( 20 Feb 2021 05:05 )  WBC Count : 7.70 K/uL  RBC Count : 5.22 M/uL  Hemoglobin : 15.4 g/dL  Hematocrit : 45.0 %  Platelet Count - Automated : 174 K/uL  Mean Cell Volume : 86.2 fL  Mean Cell Hemoglobin : 29.5 pg  Mean Cell Hemoglobin Concentration : 34.2 g/dL  Auto Neutrophil # : 4.29 K/uL  Auto Lymphocyte # : 2.55 K/uL  Auto Monocyte # : 0.70 K/uL  Auto Eosinophil # : 0.10 K/uL  Auto Basophil # : 0.03 K/uL  Auto Neutrophil % : 55.7 %  Auto Lymphocyte % : 33.1 %  Auto Monocyte % : 9.1 %  Auto Eosinophil % : 1.3 %  Auto Basophil % : 0.4 %    02-20    138  |  101  |  17  ----------------------------<  186<H>  4.1   |  27  |  0.8    Ca    9.2      20 Feb 2021 05:05  Mg     1.8     02-20    TPro  6.7  /  Alb  4.0  /  TBili  0.6  /  DBili  x   /  AST  14  /  ALT  18  /  AlkPhos  74  02-20    Creatinine Trend: 0.8<--, 0.7<--  LIVER FUNCTIONS - ( 20 Feb 2021 05:05 )  Alb: 4.0 g/dL / Pro: 6.7 g/dL / ALK PHOS: 74 U/L / ALT: 18 U/L / AST: 14 U/L / GGT: x           PT/INR - ( 19 Feb 2021 14:28 )   PT: 11.40 sec;   INR: 0.99 ratio         PTT - ( 19 Feb 2021 14:28 )  PTT:33.7 sec    hs Troponin:              CSF:                      EKG:   MICROBIOLOGY:    IMAGING:      Labs, imaging, EKG personally reviewed    RADIOLOGY & ADDITIONAL TESTS: Reviewed.

## 2021-02-21 LAB
GLUCOSE BLDC GLUCOMTR-MCNC: 211 MG/DL — HIGH (ref 70–99)
GLUCOSE BLDC GLUCOMTR-MCNC: 256 MG/DL — HIGH (ref 70–99)
GLUCOSE BLDC GLUCOMTR-MCNC: 258 MG/DL — HIGH (ref 70–99)
GLUCOSE BLDC GLUCOMTR-MCNC: 286 MG/DL — HIGH (ref 70–99)
SARS-COV-2 IGG SERPL QL IA: NEGATIVE — SIGNIFICANT CHANGE UP
SARS-COV-2 IGM SERPL IA-ACNC: <0.1 INDEX — SIGNIFICANT CHANGE UP

## 2021-02-21 PROCEDURE — 99233 SBSQ HOSP IP/OBS HIGH 50: CPT

## 2021-02-21 RX ORDER — ENOXAPARIN SODIUM 100 MG/ML
90 INJECTION SUBCUTANEOUS
Refills: 0 | Status: DISCONTINUED | OUTPATIENT
Start: 2021-02-21 | End: 2021-02-22

## 2021-02-21 RX ORDER — INSULIN GLARGINE 100 [IU]/ML
14 INJECTION, SOLUTION SUBCUTANEOUS AT BEDTIME
Refills: 0 | Status: DISCONTINUED | OUTPATIENT
Start: 2021-02-21 | End: 2021-02-23

## 2021-02-21 RX ADMIN — INSULIN GLARGINE 14 UNIT(S): 100 INJECTION, SOLUTION SUBCUTANEOUS at 22:21

## 2021-02-21 RX ADMIN — Medication 25 MILLIGRAM(S): at 06:01

## 2021-02-21 RX ADMIN — Medication 3 UNIT(S): at 12:52

## 2021-02-21 RX ADMIN — PANTOPRAZOLE SODIUM 40 MILLIGRAM(S): 20 TABLET, DELAYED RELEASE ORAL at 06:01

## 2021-02-21 RX ADMIN — CLOPIDOGREL BISULFATE 75 MILLIGRAM(S): 75 TABLET, FILM COATED ORAL at 12:50

## 2021-02-21 RX ADMIN — Medication 25 MILLIGRAM(S): at 17:06

## 2021-02-21 RX ADMIN — Medication 3 UNIT(S): at 17:07

## 2021-02-21 RX ADMIN — Medication 81 MILLIGRAM(S): at 12:49

## 2021-02-21 RX ADMIN — ATORVASTATIN CALCIUM 40 MILLIGRAM(S): 80 TABLET, FILM COATED ORAL at 12:50

## 2021-02-21 RX ADMIN — Medication 4: at 12:51

## 2021-02-21 RX ADMIN — Medication 3 UNIT(S): at 08:24

## 2021-02-21 RX ADMIN — ENOXAPARIN SODIUM 90 MILLIGRAM(S): 100 INJECTION SUBCUTANEOUS at 17:08

## 2021-02-21 RX ADMIN — Medication 2: at 08:23

## 2021-02-21 RX ADMIN — Medication 3: at 17:07

## 2021-02-21 NOTE — PROGRESS NOTE ADULT - SUBJECTIVE AND OBJECTIVE BOX
INTERVAL HPI/OVERNIGHT EVENTS:    SUBJECTIVE: Patient seen and examined at bedside.     no sob, abd pain, fever  cp this am, midsternal, radiating to L chest/ shoulder, pressure, unchanged with exertion    OBJECTIVE:    VITAL SIGNS:  Vital Signs Last 24 Hrs  T(C): 36.1 (21 Feb 2021 05:14), Max: 36.7 (20 Feb 2021 13:49)  T(F): 96.9 (21 Feb 2021 05:14), Max: 98 (20 Feb 2021 13:49)  HR: 74 (21 Feb 2021 05:14) (74 - 79)  BP: 122/66 (21 Feb 2021 05:14) (118/72 - 126/56)  BP(mean): --  RR: 18 (21 Feb 2021 05:14) (18 - 18)  SpO2: 96% (21 Feb 2021 06:38) (96% - 100%)      PHYSICAL EXAM:    General: NAD  HEENT: NC/AT; PERRL, clear conjunctiva  Neck: supple  Respiratory: CTA b/l  Cardiovascular: +S1/S2; RRR  Abdomen: soft, NT/ND; +BS x4  Extremities: WWP, 2+ peripheral pulses b/l; no LE edema  Skin: normal color and turgor; no rash  Neurological:    MEDICATIONS:  MEDICATIONS  (STANDING):  aspirin  chewable 81 milliGRAM(s) Oral daily  atorvastatin Oral Tab/Cap - Peds 40 milliGRAM(s) Oral daily  clopidogrel Tablet 75 milliGRAM(s) Oral daily  dextrose 40% Gel 15 Gram(s) Oral once  dextrose 5%. 1000 milliLiter(s) (50 mL/Hr) IV Continuous <Continuous>  dextrose 5%. 1000 milliLiter(s) (100 mL/Hr) IV Continuous <Continuous>  dextrose 50% Injectable 25 Gram(s) IV Push once  dextrose 50% Injectable 12.5 Gram(s) IV Push once  dextrose 50% Injectable 25 Gram(s) IV Push once  enoxaparin Injectable 40 milliGRAM(s) SubCutaneous at bedtime  glucagon  Injectable 1 milliGRAM(s) IntraMuscular once  insulin glargine Injectable (LANTUS) 14 Unit(s) SubCutaneous at bedtime  insulin lispro (ADMELOG) corrective regimen sliding scale   SubCutaneous three times a day before meals  insulin lispro Injectable (ADMELOG) 3 Unit(s) SubCutaneous three times a day before meals  metoprolol tartrate 25 milliGRAM(s) Oral two times a day  pantoprazole    Tablet 40 milliGRAM(s) Oral before breakfast  regadenoson Injectable 0.4 milliGRAM(s) IV Push once    MEDICATIONS  (PRN):  aluminum hydroxide/magnesium hydroxide/simethicone Suspension 30 milliLiter(s) Oral every 6 hours PRN Dyspepsia      ALLERGIES:  Allergies    Brilinta (Short breath)    Intolerances        LABS:                        15.4   7.70  )-----------( 174      ( 20 Feb 2021 05:05 )             45.0     Hemoglobin: 15.4 g/dL (02-20 @ 05:05)  Hemoglobin: 16.6 g/dL (02-19 @ 14:28)    CBC Full  -  ( 20 Feb 2021 05:05 )  WBC Count : 7.70 K/uL  RBC Count : 5.22 M/uL  Hemoglobin : 15.4 g/dL  Hematocrit : 45.0 %  Platelet Count - Automated : 174 K/uL  Mean Cell Volume : 86.2 fL  Mean Cell Hemoglobin : 29.5 pg  Mean Cell Hemoglobin Concentration : 34.2 g/dL  Auto Neutrophil # : 4.29 K/uL  Auto Lymphocyte # : 2.55 K/uL  Auto Monocyte # : 0.70 K/uL  Auto Eosinophil # : 0.10 K/uL  Auto Basophil # : 0.03 K/uL  Auto Neutrophil % : 55.7 %  Auto Lymphocyte % : 33.1 %  Auto Monocyte % : 9.1 %  Auto Eosinophil % : 1.3 %  Auto Basophil % : 0.4 %    02-20    138  |  101  |  17  ----------------------------<  186<H>  4.1   |  27  |  0.8    Ca    9.2      20 Feb 2021 05:05  Mg     1.8     02-20    TPro  6.7  /  Alb  4.0  /  TBili  0.6  /  DBili  x   /  AST  14  /  ALT  18  /  AlkPhos  74  02-20    Creatinine Trend: 0.8<--, 0.7<--  LIVER FUNCTIONS - ( 20 Feb 2021 05:05 )  Alb: 4.0 g/dL / Pro: 6.7 g/dL / ALK PHOS: 74 U/L / ALT: 18 U/L / AST: 14 U/L / GGT: x           PT/INR - ( 19 Feb 2021 14:28 )   PT: 11.40 sec;   INR: 0.99 ratio         PTT - ( 19 Feb 2021 14:28 )  PTT:33.7 sec    hs Troponin:              CSF:                      EKG:   MICROBIOLOGY:    IMAGING:      Labs, imaging, EKG personally reviewed    RADIOLOGY & ADDITIONAL TESTS: Reviewed.

## 2021-02-21 NOTE — PROGRESS NOTE ADULT - SUBJECTIVE AND OBJECTIVE BOX
Subjective: pt had cp this am, no sob.       MEDICATIONS  (STANDING):  aspirin  chewable 81 milliGRAM(s) Oral daily  atorvastatin Oral Tab/Cap - Peds 40 milliGRAM(s) Oral daily  clopidogrel Tablet 75 milliGRAM(s) Oral daily  dextrose 40% Gel 15 Gram(s) Oral once  dextrose 5%. 1000 milliLiter(s) (50 mL/Hr) IV Continuous <Continuous>  dextrose 5%. 1000 milliLiter(s) (100 mL/Hr) IV Continuous <Continuous>  dextrose 50% Injectable 25 Gram(s) IV Push once  dextrose 50% Injectable 12.5 Gram(s) IV Push once  dextrose 50% Injectable 25 Gram(s) IV Push once  enoxaparin Injectable 40 milliGRAM(s) SubCutaneous at bedtime  glucagon  Injectable 1 milliGRAM(s) IntraMuscular once  insulin glargine Injectable (LANTUS) 14 Unit(s) SubCutaneous at bedtime  insulin lispro (ADMELOG) corrective regimen sliding scale   SubCutaneous three times a day before meals  insulin lispro Injectable (ADMELOG) 3 Unit(s) SubCutaneous three times a day before meals  metoprolol tartrate 25 milliGRAM(s) Oral two times a day  pantoprazole    Tablet 40 milliGRAM(s) Oral before breakfast  regadenoson Injectable 0.4 milliGRAM(s) IV Push once    MEDICATIONS  (PRN):  aluminum hydroxide/magnesium hydroxide/simethicone Suspension 30 milliLiter(s) Oral every 6 hours PRN Dyspepsia            Vital Signs Last 24 Hrs  T(C): 36.1 (21 Feb 2021 05:14), Max: 36.7 (20 Feb 2021 13:49)  T(F): 96.9 (21 Feb 2021 05:14), Max: 98 (20 Feb 2021 13:49)  HR: 74 (21 Feb 2021 05:14) (74 - 79)  BP: 122/66 (21 Feb 2021 05:14) (118/72 - 126/56)  BP(mean): --  RR: 18 (21 Feb 2021 05:14) (18 - 18)  SpO2: 96% (21 Feb 2021 06:38) (96% - 100%)             REVIEW OF SYSTEMS:  CONSTITUTIONAL: no fever, no chills, no diaphoresis  CARDIOLOGY: no chest pain, no SOB, no palpitation, no diaphoresis, no faint   RESPIRATORY: no dyspnea, no wheeze, no orthopnea, no PND   NEUROLOGICAL: no dizziness, headache, focal deficits to report.  GI: no abdominal pain, no dyspepsia, no nausea, no vomiting, no diarrhea.    HEENT: no congestion, no nasal bleeding  SKIN: no ecchymosis, no ptechia             PHYSICAL EXAM:  · CONSTITUTIONAL: Looks stable, in no distress  . NECK: Supple, no JVD, no bruit on either carotid side   · RESPIRATORY: Normal air entry to lung base, no wheeze, no crackle, no wet rales  · CARDIOVASCULAR: Normal S1, A2, P2, no murmur, no click, regular rate,  no rub,  · EXTREMITIES: No cyanosis, no clubbing, no edema  · VASCULAR: Pulses are regular, equal, bilateral in upper and lower extremities  	  TELEMETRY:  nsr      LABS:                        15.4   7.70  )-----------( 174      ( 20 Feb 2021 05:05 )             45.0     02-20    138  |  101  |  17  ----------------------------<  186<H>  4.1   |  27  |  0.8    Ca    9.2      20 Feb 2021 05:05  Mg     1.8     02-20    TPro  6.7  /  Alb  4.0  /  TBili  0.6  /  DBili  x   /  AST  14  /  ALT  18  /  AlkPhos  74  02-20    CARDIAC MARKERS ( 20 Feb 2021 05:05 )  x     / <0.01 ng/mL / x     / x     / x      CARDIAC MARKERS ( 19 Feb 2021 21:39 )  x     / <0.01 ng/mL / x     / x     / x      CARDIAC MARKERS ( 19 Feb 2021 14:28 )  x     / <0.01 ng/mL / x     / x     / x          PT/INR - ( 19 Feb 2021 14:28 )   PT: 11.40 sec;   INR: 0.99 ratio         PTT - ( 19 Feb 2021 14:28 )  PTT:33.7 sec    I&O's Summary    20 Feb 2021 07:01  -  21 Feb 2021 07:00  --------------------------------------------------------  IN: 580 mL / OUT: 300 mL / NET: 280 mL      BNP  RADIOLOGY & ADDITIONAL STUDIES:    IMPRESSION AND PLAN:

## 2021-02-21 NOTE — PROGRESS NOTE ADULT - PROBLEM SELECTOR PLAN 1
pt with anterior ischemia on nuclear, nl ef with cp this am.  add therapeutic lovenox and hold in am for cardiac cath tomorrow  cont current meds  cont tele and if more cp then upgrade to ccu and start iv ntg  ekg now

## 2021-02-21 NOTE — PROGRESS NOTE ADULT - SUBJECTIVE AND OBJECTIVE BOX
DAILY PROGRESS NOTE  ===========================================================    Patient Information:  JOSÉ LUIS RAMESH  /  51y  /  Male  /  MRN#: [id number]    Hospital Day: 2d     |:::::::::::::::::::::::::::| SUBJECTIVE |:::::::::::::::::::::::::::|    OVERNIGHT EVENTS: None   TODAY: Patient was seen today at bedside. Review of systems is otherwise negative.    |:::::::::::::::::::::::::::| OBJECTIVE |:::::::::::::::::::::::::::|    VITAL SIGNS: Last 24 Hours  T(C): 36.1 (21 Feb 2021 05:14), Max: 36.1 (20 Feb 2021 20:37)  T(F): 96.9 (21 Feb 2021 05:14), Max: 97 (20 Feb 2021 20:37)  HR: 74 (21 Feb 2021 05:14) (74 - 76)  BP: 122/66 (21 Feb 2021 05:14) (118/72 - 122/66)  BP(mean): --  RR: 18 (21 Feb 2021 05:14) (18 - 18)  SpO2: 96% (21 Feb 2021 06:38) (96% - 100%)    02-20-21 @ 07:01  -  02-21-21 @ 07:00  --------------------------------------------------------  IN: 580 mL / OUT: 300 mL / NET: 280 mL      PHYSICAL EXAM:  GENERAL:   Awake, alert; NAD.  HEENT:  Head NC/AT; Conjunctivae pink, Sclera anicteric; Oral mucosa moist.  CARDIO:   Regular rate; Regular rhythm  RESP:   No rales, wheezing, or rhonchi appreciated.  GI:   Soft; NT/ND; BS; No guarding; No rebound tenderness.  EXT:   No edema.   SKIN:   Intact.    LAB RESULTS:                        15.4   7.70  )-----------( 174      ( 20 Feb 2021 05:05 )             45.0     02-20    138  |  101  |  17  ----------------------------<  186<H>  4.1   |  27  |  0.8    Ca    9.2      20 Feb 2021 05:05  Mg     1.8     02-20    TPro  6.7  /  Alb  4.0  /  TBili  0.6  /  DBili  x   /  AST  14  /  ALT  18  /  AlkPhos  74  02-20    PT/INR - ( 19 Feb 2021 14:28 )   PT: 11.40 sec;   INR: 0.99 ratio         PTT - ( 19 Feb 2021 14:28 )  PTT:33.7 sec        CARDIAC MARKERS ( 20 Feb 2021 05:05 )  x     / <0.01 ng/mL / x     / x     / x      CARDIAC MARKERS ( 19 Feb 2021 21:39 )  x     / <0.01 ng/mL / x     / x     / x      CARDIAC MARKERS ( 19 Feb 2021 14:28 )  x     / <0.01 ng/mL / x     / x     / x          MICROBIOLOGY:    RADIOLOGY:    ALLERGIES:  Brilinta (Short breath)      ===========================================================

## 2021-02-22 LAB
ALBUMIN SERPL ELPH-MCNC: 4.4 G/DL — SIGNIFICANT CHANGE UP (ref 3.5–5.2)
ALP SERPL-CCNC: 81 U/L — SIGNIFICANT CHANGE UP (ref 30–115)
ALT FLD-CCNC: 20 U/L — SIGNIFICANT CHANGE UP (ref 0–41)
ANION GAP SERPL CALC-SCNC: 10 MMOL/L — SIGNIFICANT CHANGE UP (ref 7–14)
AST SERPL-CCNC: 18 U/L — SIGNIFICANT CHANGE UP (ref 0–41)
BILIRUB SERPL-MCNC: 0.6 MG/DL — SIGNIFICANT CHANGE UP (ref 0.2–1.2)
BUN SERPL-MCNC: 15 MG/DL — SIGNIFICANT CHANGE UP (ref 10–20)
CALCIUM SERPL-MCNC: 9.5 MG/DL — SIGNIFICANT CHANGE UP (ref 8.5–10.1)
CHLORIDE SERPL-SCNC: 100 MMOL/L — SIGNIFICANT CHANGE UP (ref 98–110)
CO2 SERPL-SCNC: 29 MMOL/L — SIGNIFICANT CHANGE UP (ref 17–32)
CREAT SERPL-MCNC: 0.8 MG/DL — SIGNIFICANT CHANGE UP (ref 0.7–1.5)
GLUCOSE BLDC GLUCOMTR-MCNC: 197 MG/DL — HIGH (ref 70–99)
GLUCOSE BLDC GLUCOMTR-MCNC: 248 MG/DL — HIGH (ref 70–99)
GLUCOSE BLDC GLUCOMTR-MCNC: 278 MG/DL — HIGH (ref 70–99)
GLUCOSE BLDC GLUCOMTR-MCNC: 320 MG/DL — HIGH (ref 70–99)
GLUCOSE SERPL-MCNC: 191 MG/DL — HIGH (ref 70–99)
HCT VFR BLD CALC: 43.2 % — SIGNIFICANT CHANGE UP (ref 42–52)
HCT VFR BLD CALC: 46.7 % — SIGNIFICANT CHANGE UP (ref 42–52)
HGB BLD-MCNC: 14.8 G/DL — SIGNIFICANT CHANGE UP (ref 14–18)
HGB BLD-MCNC: 15.8 G/DL — SIGNIFICANT CHANGE UP (ref 14–18)
MAGNESIUM SERPL-MCNC: 2.1 MG/DL — SIGNIFICANT CHANGE UP (ref 1.8–2.4)
MCHC RBC-ENTMCNC: 29.4 PG — SIGNIFICANT CHANGE UP (ref 27–31)
MCHC RBC-ENTMCNC: 29.6 PG — SIGNIFICANT CHANGE UP (ref 27–31)
MCHC RBC-ENTMCNC: 33.8 G/DL — SIGNIFICANT CHANGE UP (ref 32–37)
MCHC RBC-ENTMCNC: 34.3 G/DL — SIGNIFICANT CHANGE UP (ref 32–37)
MCV RBC AUTO: 86.4 FL — SIGNIFICANT CHANGE UP (ref 80–94)
MCV RBC AUTO: 86.8 FL — SIGNIFICANT CHANGE UP (ref 80–94)
NRBC # BLD: 0 /100 WBCS — SIGNIFICANT CHANGE UP (ref 0–0)
NRBC # BLD: 0 /100 WBCS — SIGNIFICANT CHANGE UP (ref 0–0)
PLATELET # BLD AUTO: 174 K/UL — SIGNIFICANT CHANGE UP (ref 130–400)
PLATELET # BLD AUTO: 176 K/UL — SIGNIFICANT CHANGE UP (ref 130–400)
POTASSIUM SERPL-MCNC: 4.1 MMOL/L — SIGNIFICANT CHANGE UP (ref 3.5–5)
POTASSIUM SERPL-SCNC: 4.1 MMOL/L — SIGNIFICANT CHANGE UP (ref 3.5–5)
PROT SERPL-MCNC: 7.3 G/DL — SIGNIFICANT CHANGE UP (ref 6–8)
RBC # BLD: 5 M/UL — SIGNIFICANT CHANGE UP (ref 4.7–6.1)
RBC # BLD: 5.38 M/UL — SIGNIFICANT CHANGE UP (ref 4.7–6.1)
RBC # FLD: 11.7 % — SIGNIFICANT CHANGE UP (ref 11.5–14.5)
RBC # FLD: 11.7 % — SIGNIFICANT CHANGE UP (ref 11.5–14.5)
SODIUM SERPL-SCNC: 139 MMOL/L — SIGNIFICANT CHANGE UP (ref 135–146)
WBC # BLD: 6.63 K/UL — SIGNIFICANT CHANGE UP (ref 4.8–10.8)
WBC # BLD: 7.27 K/UL — SIGNIFICANT CHANGE UP (ref 4.8–10.8)
WBC # FLD AUTO: 6.63 K/UL — SIGNIFICANT CHANGE UP (ref 4.8–10.8)
WBC # FLD AUTO: 7.27 K/UL — SIGNIFICANT CHANGE UP (ref 4.8–10.8)

## 2021-02-22 PROCEDURE — 93458 L HRT ARTERY/VENTRICLE ANGIO: CPT | Mod: 26,XU

## 2021-02-22 PROCEDURE — 99233 SBSQ HOSP IP/OBS HIGH 50: CPT

## 2021-02-22 PROCEDURE — 92928 PRQ TCAT PLMT NTRAC ST 1 LES: CPT | Mod: LD

## 2021-02-22 PROCEDURE — 93010 ELECTROCARDIOGRAM REPORT: CPT

## 2021-02-22 RX ORDER — PRASUGREL 5 MG/1
30 TABLET, FILM COATED ORAL ONCE
Refills: 0 | Status: COMPLETED | OUTPATIENT
Start: 2021-02-22 | End: 2021-02-22

## 2021-02-22 RX ORDER — SODIUM CHLORIDE 9 MG/ML
1000 INJECTION INTRAMUSCULAR; INTRAVENOUS; SUBCUTANEOUS
Refills: 0 | Status: DISCONTINUED | OUTPATIENT
Start: 2021-02-22 | End: 2021-02-23

## 2021-02-22 RX ORDER — PRASUGREL 5 MG/1
10 TABLET, FILM COATED ORAL DAILY
Refills: 0 | Status: DISCONTINUED | OUTPATIENT
Start: 2021-02-23 | End: 2021-02-23

## 2021-02-22 RX ORDER — CLOPIDOGREL BISULFATE 75 MG/1
1 TABLET, FILM COATED ORAL
Qty: 0 | Refills: 0 | DISCHARGE

## 2021-02-22 RX ORDER — PRASUGREL 5 MG/1
1 TABLET, FILM COATED ORAL
Qty: 30 | Refills: 1
Start: 2021-02-22 | End: 2021-04-22

## 2021-02-22 RX ORDER — INSULIN LISPRO 100/ML
6 VIAL (ML) SUBCUTANEOUS
Refills: 0 | Status: DISCONTINUED | OUTPATIENT
Start: 2021-02-22 | End: 2021-02-23

## 2021-02-22 RX ADMIN — Medication 81 MILLIGRAM(S): at 10:01

## 2021-02-22 RX ADMIN — SODIUM CHLORIDE 100 MILLILITER(S): 9 INJECTION INTRAMUSCULAR; INTRAVENOUS; SUBCUTANEOUS at 12:33

## 2021-02-22 RX ADMIN — CLOPIDOGREL BISULFATE 75 MILLIGRAM(S): 75 TABLET, FILM COATED ORAL at 10:01

## 2021-02-22 RX ADMIN — Medication 25 MILLIGRAM(S): at 05:32

## 2021-02-22 RX ADMIN — PRASUGREL 30 MILLIGRAM(S): 5 TABLET, FILM COATED ORAL at 12:33

## 2021-02-22 RX ADMIN — Medication 6 UNIT(S): at 16:59

## 2021-02-22 RX ADMIN — PANTOPRAZOLE SODIUM 40 MILLIGRAM(S): 20 TABLET, DELAYED RELEASE ORAL at 05:32

## 2021-02-22 RX ADMIN — Medication 3: at 16:59

## 2021-02-22 RX ADMIN — Medication 25 MILLIGRAM(S): at 17:00

## 2021-02-22 RX ADMIN — ATORVASTATIN CALCIUM 40 MILLIGRAM(S): 80 TABLET, FILM COATED ORAL at 21:55

## 2021-02-22 RX ADMIN — INSULIN GLARGINE 14 UNIT(S): 100 INJECTION, SOLUTION SUBCUTANEOUS at 21:55

## 2021-02-22 NOTE — PROGRESS NOTE ADULT - SUBJECTIVE AND OBJECTIVE BOX
DAILY PROGRESS NOTE  ===========================================================    Patient Information:  JOSÉ LUIS RAMESH  /  51y  /  Male  /  MRN#: [id number]    Hospital Day: 3d     |:::::::::::::::::::::::::::| SUBJECTIVE |:::::::::::::::::::::::::::|    OVERNIGHT EVENTS:   TODAY: Patient was seen today at bedside. Currently NAD, endorses mild chest pain on exertion but none at rest. Review of systems is otherwise negative.    |:::::::::::::::::::::::::::| OBJECTIVE |:::::::::::::::::::::::::::|    VITAL SIGNS: Last 24 Hours  T(C): 36.4 (22 Feb 2021 06:14), Max: 36.4 (22 Feb 2021 05:59)  T(F): 97.5 (22 Feb 2021 06:14), Max: 97.5 (22 Feb 2021 05:59)  HR: 97 (22 Feb 2021 06:14) (74 - 97)  BP: 138/87 (22 Feb 2021 06:14) (111/64 - 138/87)  BP(mean): --  RR: 18 (22 Feb 2021 06:14) (18 - 18)  SpO2: 92% (21 Feb 2021 20:57) (92% - 92%)    02-21-21 @ 07:01  -  02-22-21 @ 07:00  --------------------------------------------------------  IN: 800 mL / OUT: 800 mL / NET: 0 mL      PHYSICAL EXAM:  GENERAL:   Awake, alert; NAD.  CARDIO:   Regular rate; Regular rhythm; S1 & S2.  RESP:   No rales, wheezing, or rhonchi appreciated.  GI:   Soft; NT/ND; -BS; No guarding; No rebound tenderness.  EXT:  No edema.   SKIN:   Intact.    LAB RESULTS:                        15.8   6.63  )-----------( 174      ( 22 Feb 2021 06:17 )             46.7     02-22    139  |  100  |  15  ----------------------------<  191<H>  4.1   |  29  |  0.8    Ca    9.5      22 Feb 2021 06:17  Mg     2.1     02-22    TPro  7.3  /  Alb  4.4  /  TBili  0.6  /  DBili  x   /  AST  18  /  ALT  20  /  AlkPhos  81  02-22          ALLERGIES:  Brilinta (Short breath)      ===========================================================     DAILY PROGRESS NOTE  ===========================================================    Patient Information:  JOSÉ LUIS RAMESH  /  51y  /  Male  /  MRN#: [id number]    Hospital Day: 3d     |:::::::::::::::::::::::::::| SUBJECTIVE |:::::::::::::::::::::::::::|    OVERNIGHT EVENTS:   TODAY: Patient was seen today at bedside. Currently NAD, endorses 6/10 chest pain on exertion but none at rest. Review of systems is otherwise negative.    |:::::::::::::::::::::::::::| OBJECTIVE |:::::::::::::::::::::::::::|    VITAL SIGNS: Last 24 Hours  T(C): 36.4 (22 Feb 2021 06:14), Max: 36.4 (22 Feb 2021 05:59)  T(F): 97.5 (22 Feb 2021 06:14), Max: 97.5 (22 Feb 2021 05:59)  HR: 97 (22 Feb 2021 06:14) (74 - 97)  BP: 138/87 (22 Feb 2021 06:14) (111/64 - 138/87)  BP(mean): --  RR: 18 (22 Feb 2021 06:14) (18 - 18)  SpO2: 92% (21 Feb 2021 20:57) (92% - 92%)    02-21-21 @ 07:01  -  02-22-21 @ 07:00  --------------------------------------------------------  IN: 800 mL / OUT: 800 mL / NET: 0 mL      PHYSICAL EXAM:  GENERAL:   Awake, alert; NAD.  CARDIO:   Regular rate; Regular rhythm; S1 & S2.  RESP:   No rales, wheezing, or rhonchi appreciated.  GI:   Soft; NT/ND; -BS; No guarding; No rebound tenderness.  EXT:  No edema.   SKIN:   Intact.    LAB RESULTS:                        15.8   6.63  )-----------( 174      ( 22 Feb 2021 06:17 )             46.7     02-22    139  |  100  |  15  ----------------------------<  191<H>  4.1   |  29  |  0.8    Ca    9.5      22 Feb 2021 06:17  Mg     2.1     02-22    TPro  7.3  /  Alb  4.4  /  TBili  0.6  /  DBili  x   /  AST  18  /  ALT  20  /  AlkPhos  81  02-22          ALLERGIES:  Brilinta (Short breath)      ===========================================================     DAILY PROGRESS NOTE  ===========================================================    Patient Information:  JOSÉ LUIS RAMESH  /  51y  /  Male  /  MRN#: [id number]    Hospital Day: 3d     |:::::::::::::::::::::::::::| SUBJECTIVE |:::::::::::::::::::::::::::|    OVERNIGHT EVENTS:   TODAY: Patient was seen today at bedside. Currently NAD, endorses 6/10 chest pain on exertion but none at rest. Review of systems is otherwise negative.  no sob, abd pain, fever  no orthopnea, le palpitaitons    |:::::::::::::::::::::::::::| OBJECTIVE |:::::::::::::::::::::::::::|    VITAL SIGNS: Last 24 Hours  T(C): 36.4 (22 Feb 2021 06:14), Max: 36.4 (22 Feb 2021 05:59)  T(F): 97.5 (22 Feb 2021 06:14), Max: 97.5 (22 Feb 2021 05:59)  HR: 97 (22 Feb 2021 06:14) (74 - 97)  BP: 138/87 (22 Feb 2021 06:14) (111/64 - 138/87)  BP(mean): --  RR: 18 (22 Feb 2021 06:14) (18 - 18)  SpO2: 92% (21 Feb 2021 20:57) (92% - 92%)    02-21-21 @ 07:01  -  02-22-21 @ 07:00  --------------------------------------------------------  IN: 800 mL / OUT: 800 mL / NET: 0 mL      PHYSICAL EXAM:  GENERAL:   Awake, alert; NAD.  CARDIO:   Regular rate; Regular rhythm; S1 & S2.  RESP:   No rales, wheezing, or rhonchi appreciated.  GI:   Soft; NT/ND; -BS; No guarding; No rebound tenderness.  EXT:  No edema.   SKIN:   Intact.    LAB RESULTS:                        15.8   6.63  )-----------( 174      ( 22 Feb 2021 06:17 )             46.7     02-22    139  |  100  |  15  ----------------------------<  191<H>  4.1   |  29  |  0.8    Ca    9.5      22 Feb 2021 06:17  Mg     2.1     02-22    TPro  7.3  /  Alb  4.4  /  TBili  0.6  /  DBili  x   /  AST  18  /  ALT  20  /  AlkPhos  81  02-22          ALLERGIES:  Brilinta (Short breath)      ===========================================================

## 2021-02-22 NOTE — CHART NOTE - NSCHARTNOTEFT_GEN_A_CORE
INTERVENTIONAL CARDIOLOGY NP:    prescription sent for effient 10mg po daily, copay $15/month, pt aware and agreeable, RX available for pick-up. Plavix d/c'd, pt given effient 30 mg po x 1 in cath lab recovery, maintenance dose effient 10 mg po daily to start 2/23 am (ordered in [place])

## 2021-02-22 NOTE — PROGRESS NOTE ADULT - ATTENDING COMMENTS
#Chest pain  nst with reversible defect apex, antseptal wall  s/p asa load  asa, plavix  therapeutic lovenox, am dose held for [facility] today, f/u cards  lipitor 40

## 2021-02-22 NOTE — CHART NOTE - NSCHARTNOTEFT_GEN_A_CORE
PRE-OP DIAGNOSIS: unstable angina , abnormal stress test, HTN, DM, DL, CAD s/p PCI to RI ~ 10 years ago   PROCEDURE: [facility] with coronary angiography    Physician: Dr Franklin  Assistant: Juan C Roque     ANESTHESIA TYPE:  [  ]General Anesthesia  [  ] Sedation  [  x] Local/Regional    ESTIMATED BLOOD LOSS:    10   mL    CONDITION  [  ] Critical  [  ] Serious  [  ]Fair  [ x ]Good      SPECIMENS REMOVED (IF APPLICABLE): N/A      IV CONTRAST:     175        mL      IMPLANTS (IF APPLICABLE)      FINDINGS    Left Heart Catheterization:  LVEF%: 55 by nuclear   LVEDP: normal       LEFT HEART CATHETERIZATION                                    Left main normal      LAD:    Prx 80-90% diffuse disease, MId 99% positive thrombus, DIstal mild disease                    Diag: mild disease    Left Circumflex: Prox normal DIstal 40%   OM: normal     Right Coronary Artery: Prox moderate disease, MId 95%, Distal mild disease  RPDA mild disease      Ramus Intermed: Patent stent     DOMINANCE: Right    ACCESS: Right radial   CLOSURE: D stat     INTERVENTION  Successful PCI to Prox/MId LAD with balloon angioplasty and two CHINYERE SYNERGY 3 x 32 mm and SYNERGY 3.5 x 8 mm      POST-OP DIAGNOSIS  SIgnificant 2 vessel disease, unstable angina , abnormal NST AUC score 8 rec A for revascularization         PLAN OF CARE    [x ] Return to In-patient bed  [x ]  Continue DAPT, B-blocker & Statin therapy  IV hydration monitor kidney function      Results of procedure/ plan of care discussed with patient/  in detail. PRE-OP DIAGNOSIS: unstable angina , abnormal stress test, HTN, DM, DL, CAD s/p PCI to RI ~ 10 years ago   PROCEDURE: [facility] with coronary angiography    Physician: Dr Franklin  Assistant: Juan C Roque     ANESTHESIA TYPE:  [  ]General Anesthesia  [  ] Sedation  [  x] Local/Regional    ESTIMATED BLOOD LOSS:    10   mL    CONDITION  [  ] Critical  [  ] Serious  [  ]Fair  [ x ]Good      SPECIMENS REMOVED (IF APPLICABLE): N/A      IV CONTRAST:     175        mL      IMPLANTS (IF APPLICABLE)      FINDINGS    Left Heart Catheterization:  LVEF%: 55 by nuclear   LVEDP: normal       LEFT HEART CATHETERIZATION                                    Left main normal      LAD:    Prx 80-90% diffuse disease, MId 95% filling defect consistent with thrombus, DIstal mild to mod disease                    Diag: mild disease    Left Circumflex: Prox normal DIstal 40%   OM: normal     Right Coronary Artery: Prox mild instent disease, MId 80-90%, Distal mild disease  RPDA mild disease      Ramus Intermed: Patent stent     DOMINANCE: Right    ACCESS: Right radial   CLOSURE: D stat     INTERVENTION  Successful PCI to Prox/MId LAD with balloon angioplasty and two CHINYERE SYNERGY 3 x 32 mm and SYNERGY 3.5 x 8 mm      POST-OP DIAGNOSIS  SIgnificant 2 vessel disease, unstable angina , abnormal NST AUC score 8 rec A for revascularization         PLAN OF CARE    [x ] Return to In-patient bed  [x ]  Continue DAPT, B-blocker & Statin therapy  IV hydration monitor kidney function      Results of procedure/ plan of care discussed with patient/  in detail. PRE-OP DIAGNOSIS: unstable angina , abnormal stress test, HTN, DM, DL, CAD s/p PCI to RI ~ 10 years ago   PROCEDURE: [facility] with coronary angiography    Physician: Dr Franklin  Assistant: Juan C Roque     ANESTHESIA TYPE:  [  ]General Anesthesia  [  ] Sedation  [  x] Local/Regional    ESTIMATED BLOOD LOSS:    10   mL    CONDITION  [  ] Critical  [  ] Serious  [  ]Fair  [ x ]Good      SPECIMENS REMOVED (IF APPLICABLE): N/A      IV CONTRAST:     175        mL      IMPLANTS (IF APPLICABLE)      FINDINGS    Left Heart Catheterization:  LVEF%: 55 by nuclear   LVEDP: normal       LEFT HEART CATHETERIZATION                                    Left main normal      LAD:    Prx 80-90% diffuse disease, MId 95% filling defect consistent with thrombus, DIstal mild to mod disease                    Diag: mild disease    Left Circumflex: Prox normal DIstal 40%   OM: normal     Right Coronary Artery: Prox mild instent disease, MId 80-90%, Distal mild disease  RPDA mild disease      Ramus Intermed: Patent stent     DOMINANCE: Right    ACCESS: Right radial   CLOSURE: D stat     INTERVENTION  Successful PCI to Prox/MId LAD with balloon angioplasty and two CHINYERE SYNERGY 3 x 32 mm and SYNERGY 3.5 x 8 mm      POST-OP DIAGNOSIS  SIgnificant 2 vessel disease, unstable angina , abnormal NST AUC score 8 rec A for revascularization SYNTAX score 15        PLAN OF CARE    [x ] Return to In-patient bed  [x ]  Continue DAPT, B-blocker & Statin therapy  IV hydration monitor kidney function      Results of procedure/ plan of care discussed with patient/  in detail.

## 2021-02-23 ENCOUNTER — TRANSCRIPTION ENCOUNTER (OUTPATIENT)
Age: 51
End: 2021-02-23

## 2021-02-23 VITALS — RESPIRATION RATE: 18 BRPM | OXYGEN SATURATION: 98 %

## 2021-02-23 LAB
ALBUMIN SERPL ELPH-MCNC: 3.9 G/DL — SIGNIFICANT CHANGE UP (ref 3.5–5.2)
ALP SERPL-CCNC: 72 U/L — SIGNIFICANT CHANGE UP (ref 30–115)
ALT FLD-CCNC: 21 U/L — SIGNIFICANT CHANGE UP (ref 0–41)
ANION GAP SERPL CALC-SCNC: 10 MMOL/L — SIGNIFICANT CHANGE UP (ref 7–14)
AST SERPL-CCNC: 15 U/L — SIGNIFICANT CHANGE UP (ref 0–41)
BILIRUB SERPL-MCNC: 0.5 MG/DL — SIGNIFICANT CHANGE UP (ref 0.2–1.2)
BUN SERPL-MCNC: 14 MG/DL — SIGNIFICANT CHANGE UP (ref 10–20)
CALCIUM SERPL-MCNC: 9 MG/DL — SIGNIFICANT CHANGE UP (ref 8.5–10.1)
CHLORIDE SERPL-SCNC: 101 MMOL/L — SIGNIFICANT CHANGE UP (ref 98–110)
CO2 SERPL-SCNC: 27 MMOL/L — SIGNIFICANT CHANGE UP (ref 17–32)
CREAT SERPL-MCNC: 0.7 MG/DL — SIGNIFICANT CHANGE UP (ref 0.7–1.5)
GLUCOSE BLDC GLUCOMTR-MCNC: 213 MG/DL — HIGH (ref 70–99)
GLUCOSE BLDC GLUCOMTR-MCNC: 223 MG/DL — HIGH (ref 70–99)
GLUCOSE SERPL-MCNC: 194 MG/DL — HIGH (ref 70–99)
HCT VFR BLD CALC: 42 % — SIGNIFICANT CHANGE UP (ref 42–52)
HGB BLD-MCNC: 14.4 G/DL — SIGNIFICANT CHANGE UP (ref 14–18)
MAGNESIUM SERPL-MCNC: 1.8 MG/DL — SIGNIFICANT CHANGE UP (ref 1.8–2.4)
MCHC RBC-ENTMCNC: 29.4 PG — SIGNIFICANT CHANGE UP (ref 27–31)
MCHC RBC-ENTMCNC: 34.3 G/DL — SIGNIFICANT CHANGE UP (ref 32–37)
MCV RBC AUTO: 85.7 FL — SIGNIFICANT CHANGE UP (ref 80–94)
NRBC # BLD: 0 /100 WBCS — SIGNIFICANT CHANGE UP (ref 0–0)
PLATELET # BLD AUTO: 145 K/UL — SIGNIFICANT CHANGE UP (ref 130–400)
POTASSIUM SERPL-MCNC: 3.9 MMOL/L — SIGNIFICANT CHANGE UP (ref 3.5–5)
POTASSIUM SERPL-SCNC: 3.9 MMOL/L — SIGNIFICANT CHANGE UP (ref 3.5–5)
PROT SERPL-MCNC: 6.5 G/DL — SIGNIFICANT CHANGE UP (ref 6–8)
RBC # BLD: 4.9 M/UL — SIGNIFICANT CHANGE UP (ref 4.7–6.1)
RBC # FLD: 11.6 % — SIGNIFICANT CHANGE UP (ref 11.5–14.5)
SODIUM SERPL-SCNC: 138 MMOL/L — SIGNIFICANT CHANGE UP (ref 135–146)
WBC # BLD: 6.38 K/UL — SIGNIFICANT CHANGE UP (ref 4.8–10.8)
WBC # FLD AUTO: 6.38 K/UL — SIGNIFICANT CHANGE UP (ref 4.8–10.8)

## 2021-02-23 PROCEDURE — 93010 ELECTROCARDIOGRAM REPORT: CPT

## 2021-02-23 PROCEDURE — 99239 HOSP IP/OBS DSCHRG MGMT >30: CPT

## 2021-02-23 PROCEDURE — 99232 SBSQ HOSP IP/OBS MODERATE 35: CPT

## 2021-02-23 RX ORDER — ATORVASTATIN CALCIUM 80 MG/1
1 TABLET, FILM COATED ORAL
Qty: 0 | Refills: 0 | DISCHARGE

## 2021-02-23 RX ORDER — METOPROLOL TARTRATE 50 MG
1 TABLET ORAL
Qty: 60 | Refills: 0
Start: 2021-02-23 | End: 2021-03-24

## 2021-02-23 RX ORDER — ASPIRIN/CALCIUM CARB/MAGNESIUM 324 MG
1 TABLET ORAL
Qty: 30 | Refills: 0
Start: 2021-02-23 | End: 2021-03-24

## 2021-02-23 RX ORDER — ASPIRIN/CALCIUM CARB/MAGNESIUM 324 MG
1 TABLET ORAL
Qty: 0 | Refills: 0 | DISCHARGE

## 2021-02-23 RX ORDER — ATORVASTATIN CALCIUM 80 MG/1
1 TABLET, FILM COATED ORAL
Qty: 30 | Refills: 0
Start: 2021-02-23 | End: 2021-03-24

## 2021-02-23 RX ORDER — INFLUENZA VIRUS VACCINE 15; 15; 15; 15 UG/.5ML; UG/.5ML; UG/.5ML; UG/.5ML
0.5 SUSPENSION INTRAMUSCULAR ONCE
Refills: 0 | Status: DISCONTINUED | OUTPATIENT
Start: 2021-02-23 | End: 2021-02-23

## 2021-02-23 RX ORDER — METOPROLOL TARTRATE 50 MG
1 TABLET ORAL
Qty: 0 | Refills: 0 | DISCHARGE

## 2021-02-23 RX ORDER — PRASUGREL 5 MG/1
1 TABLET, FILM COATED ORAL
Qty: 0 | Refills: 0 | DISCHARGE
Start: 2021-02-23

## 2021-02-23 RX ADMIN — Medication 6 UNIT(S): at 08:16

## 2021-02-23 RX ADMIN — Medication 6 UNIT(S): at 12:22

## 2021-02-23 RX ADMIN — Medication 2: at 08:16

## 2021-02-23 RX ADMIN — Medication 25 MILLIGRAM(S): at 05:56

## 2021-02-23 RX ADMIN — PANTOPRAZOLE SODIUM 40 MILLIGRAM(S): 20 TABLET, DELAYED RELEASE ORAL at 05:56

## 2021-02-23 RX ADMIN — Medication 81 MILLIGRAM(S): at 12:23

## 2021-02-23 RX ADMIN — PRASUGREL 10 MILLIGRAM(S): 5 TABLET, FILM COATED ORAL at 12:23

## 2021-02-23 RX ADMIN — Medication 2: at 12:22

## 2021-02-23 NOTE — DISCHARGE NOTE PROVIDER - NSDCCPCAREPLAN_GEN_ALL_CORE_FT
PRINCIPAL DISCHARGE DIAGNOSIS  Diagnosis: Unstable angina  Assessment and Plan of Treatment: Chest pain can be caused by many different conditions which may or may not be dangerous. Causes include heartburn, lung infections, heart attack, blood clot in lungs, skin infections, strain or damage to muscle, cartilage, or bones, etc. In addition to a history and physical examination, an electrocardiogram (ECG) or other lab tests may have been performed to determine the cause of your chest pain. Follow up with your primary care provider or with a cardiologist as instructed.   *  You had chest pain because one of the vesssels supplying your heart muscle with blood/oxygen. We performed a cardiac catheterization and a stent was placed in this vessel.  *  Call your healthcare provider right away if you have any of the following:  Chest pain or a return of the symptoms you had before the angioplasty  Constant or increasing pain or numbness in your leg, or if your leg looks blue or feels cold  Fever above  100.4° F ( 38.0°C) or other signs of infection (redness, swelling, drainage, or warmth at the incision site of the leg or wrist)  Shortness of breath  Feeling faint  Trouble speaking or weakness in any muscle  *  Do not smoke. Nicotine and other chemicals in cigarettes and cigars can cause heart and lung damage. ...  Exercise regularly. ...  Maintain a healthy weight. ...  Eat heart-healthy foods. ...  Limit or do not drink alcohol. ...  Manage other health conditions         PRINCIPAL DISCHARGE DIAGNOSIS  Diagnosis: Unstable angina  Assessment and Plan of Treatment: Chest pain can be caused by many different conditions which may or may not be dangerous. Causes include heartburn, lung infections, heart attack, blood clot in lungs, skin infections, strain or damage to muscle, cartilage, or bones, etc. In addition to a history and physical examination, an electrocardiogram (ECG) or other lab tests may have been performed to determine the cause of your chest pain. Follow up with your primary care provider or with a cardiologist as instructed.   *  You had chest pain because one of the vesssels supplying your heart muscle with blood/oxygen. We performed a cardiac catheterization and a stent was placed in this vessel.  *  Call your healthcare provider right away if you have any of the following:  Chest pain or a return of the symptoms you had before the angioplasty  Constant or increasing pain or numbness in your leg, or if your leg looks blue or feels cold  Fever above  100.4° F ( 38.0°C) or other signs of infection (redness, swelling, drainage, or warmth at the incision site of the leg or wrist)  Shortness of breath  Feeling faint  Trouble speaking or weakness in any muscle  *  Do not smoke. Nicotine and other chemicals in cigarettes and cigars can cause heart and lung damage. ...  Exercise regularly. ...  Maintain a healthy weight. ...  Eat heart-healthy foods. ...  Limit or do not drink alcohol. ...  Manage other health conditions  Please continue all medications as prescribed and follow up with Dr. Franklin as an outpatient.

## 2021-02-23 NOTE — PROGRESS NOTE ADULT - PROVIDER SPECIALTY LIST ADULT
Internal Medicine
Internal Medicine
Intervent Cardiology
Internal Medicine
Cardiology

## 2021-02-23 NOTE — DISCHARGE NOTE NURSING/CASE MANAGEMENT/SOCIAL WORK - PATIENT PORTAL LINK FT
You can access the FollowMyHealth Patient Portal offered by [facility] by registering at the following website: [email or web address]. By joining [facility]’s FollowMyHealth portal, you will also be able to view your health information using other applications (apps) compatible with our system.

## 2021-02-23 NOTE — PROGRESS NOTE ADULT - SUBJECTIVE AND OBJECTIVE BOX
INTERVAL HPI/OVERNIGHT EVENTS:    SUBJECTIVE: Patient seen and examined at bedside.     no cp, sob, abd pain, fever  no cp, palpitations, le, orthopnea    OBJECTIVE:    VITAL SIGNS:  Vital Signs Last 24 Hrs  T(C): 36.1 (23 Feb 2021 05:27), Max: 36.1 (22 Feb 2021 20:56)  T(F): 97 (23 Feb 2021 05:27), Max: 97 (23 Feb 2021 05:27)  HR: 79 (23 Feb 2021 05:54) (73 - 82)  BP: 141/81 (23 Feb 2021 05:54) (99/61 - 148/83)  BP(mean): --  RR: 18 (23 Feb 2021 08:57) (18 - 18)  SpO2: 98% (23 Feb 2021 08:57) (95% - 98%)      PHYSICAL EXAM:    General: NAD  HEENT: NC/AT; PERRL, clear conjunctiva  Neck: supple  Respiratory: CTA b/l  Cardiovascular: +S1/S2; RRR  Abdomen: soft, NT/ND; +BS x4  Extremities: WWP, 2+ peripheral pulses b/l; no LE edema  Skin: normal color and turgor; no rash  Neurological:    MEDICATIONS:  MEDICATIONS  (STANDING):  aspirin  chewable 81 milliGRAM(s) Oral daily  atorvastatin Oral Tab/Cap - Peds 40 milliGRAM(s) Oral daily  dextrose 40% Gel 15 Gram(s) Oral once  dextrose 5%. 1000 milliLiter(s) (50 mL/Hr) IV Continuous <Continuous>  dextrose 5%. 1000 milliLiter(s) (100 mL/Hr) IV Continuous <Continuous>  dextrose 50% Injectable 25 Gram(s) IV Push once  dextrose 50% Injectable 12.5 Gram(s) IV Push once  dextrose 50% Injectable 25 Gram(s) IV Push once  glucagon  Injectable 1 milliGRAM(s) IntraMuscular once  influenza   Vaccine 0.5 milliLiter(s) IntraMuscular once  insulin glargine Injectable (LANTUS) 14 Unit(s) SubCutaneous at bedtime  insulin lispro (ADMELOG) corrective regimen sliding scale   SubCutaneous three times a day before meals  insulin lispro Injectable (ADMELOG) 6 Unit(s) SubCutaneous three times a day before meals  metoprolol tartrate 25 milliGRAM(s) Oral two times a day  pantoprazole    Tablet 40 milliGRAM(s) Oral before breakfast  prasugrel 10 milliGRAM(s) Oral daily  regadenoson Injectable 0.4 milliGRAM(s) IV Push once  sodium chloride 0.9%. 1000 milliLiter(s) (100 mL/Hr) IV Continuous <Continuous>    MEDICATIONS  (PRN):  aluminum hydroxide/magnesium hydroxide/simethicone Suspension 30 milliLiter(s) Oral every 6 hours PRN Dyspepsia      ALLERGIES:  Allergies    Brilinta (Short breath)    Intolerances        LABS:                        14.4   6.38  )-----------( 145      ( 23 Feb 2021 06:07 )             42.0     Hemoglobin: 14.4 g/dL (02-23 @ 06:07)  Hemoglobin: 14.8 g/dL (02-22 @ 18:00)  Hemoglobin: 15.8 g/dL (02-22 @ 06:17)  Hemoglobin: 15.4 g/dL (02-20 @ 05:05)  Hemoglobin: 16.6 g/dL (02-19 @ 14:28)    CBC Full  -  ( 23 Feb 2021 06:07 )  WBC Count : 6.38 K/uL  RBC Count : 4.90 M/uL  Hemoglobin : 14.4 g/dL  Hematocrit : 42.0 %  Platelet Count - Automated : 145 K/uL  Mean Cell Volume : 85.7 fL  Mean Cell Hemoglobin : 29.4 pg  Mean Cell Hemoglobin Concentration : 34.3 g/dL  Auto Neutrophil # : x  Auto Lymphocyte # : x  Auto Monocyte # : x  Auto Eosinophil # : x  Auto Basophil # : x  Auto Neutrophil % : x  Auto Lymphocyte % : x  Auto Monocyte % : x  Auto Eosinophil % : x  Auto Basophil % : x    02-23    138  |  101  |  14  ----------------------------<  194<H>  3.9   |  27  |  0.7    Ca    9.0      23 Feb 2021 06:07  Mg     1.8     02-23    TPro  6.5  /  Alb  3.9  /  TBili  0.5  /  DBili  x   /  AST  15  /  ALT  21  /  AlkPhos  72  02-23    Creatinine Trend: 0.7<--, 0.8<--, 0.8<--, 0.7<--  LIVER FUNCTIONS - ( 23 Feb 2021 06:07 )  Alb: 3.9 g/dL / Pro: 6.5 g/dL / ALK PHOS: 72 U/L / ALT: 21 U/L / AST: 15 U/L / GGT: x               hs Troponin:              CSF:                      EKG:   MICROBIOLOGY:    IMAGING:      Labs, imaging, EKG personally reviewed    RADIOLOGY & ADDITIONAL TESTS: Reviewed.

## 2021-02-23 NOTE — PROGRESS NOTE ADULT - ASSESSMENT
51M PMHx CAD s/p pci x2, DM2, HTN here with chest pain.    #Chest pain, described as midsternal, radiating to L chest/ shoulder, back  worsen with movement, not reproducible  Cardio recs appreciated            pt with anterior ischemia on nuclear, nl ef with cp             cont therapeutic lovenox and hold in am for cardiac cath tomorrow            cont current meds            cont tele and if more cp then upgrade to ccu and start iv ntg       Trop Neg x 3  s/p asa load  Cont asa, statin,  plavix      #DM2  lantus 14  humalog 3 tid  ssi      #HTN  lopressor 25 bid      #DVT ppx : lovenox  
51M PMHx CAD s/p pci x2, DM2, HTN here with chest pain.    #Chest pain, described as midsternal, radiating to L chest/ shoulder, back  worsen with movement, not reproducible  ce neg  ekg noted  nst with reversible defect apex, antseptal wall  s/p asa load  hold on plavix load  asa, plavix  lipitor 40  f/u cards if need for hep gtt  #DM2  lantus 10  humalog 3 tid  ssi  #HTN  lopressor 25 bid  #DVT ppx  lovenox    #Progress Note Handoff:  Pending (specify):  Consults_________, Tests________, Test Results_______, Other___f/u cards______  Family discussion: d/w pt at bedside re: treatment plan, primary dx  Disposition: Home___/SNF___/Other________/Unknown at this time____x____    
51M PMHx CAD s/p pci x2, DM2, HTN here with chest pain.    #Chest pain, described as midsternal, radiating to L chest/ shoulder, back  worsen with movement, not reproducible  since resolved  ce neg  ekg noted  nst with reversible defect apex, antseptal wall  s/p asa load  hold on plavix load  asa, plavix  lipitor 40  f/u cards, hold on ac at this time  possible c tm  #DM2  lantus 14  humalog 3 tid  ssi  #HTN  lopressor 25 bid  #DVT ppx  lovenox    #Progress Note Handoff:  Pending (specify):  Consults_________, Tests________, Test Results_______, Other___f/u cards______  Family discussion: d/w pt at bedside re: treatment plan, primary dx  Disposition: Home___/SNF___/Other________/Unknown at this time____x____    
51M PMHx CAD s/p pci x2, DM2, HTN here with chest pain. Patient is currently doing well and endorses 1/10 chest pain currently, while at rest. Patient is NPO awaiting cardiac cath today.     #Chest pain, described as midsternal, radiating to L chest/ shoulder, back  worsen with movement, not reproducible  Cardio recs appreciated            pt with anterior ischemia on nuclear, nl ef with cp             cardiac cath today            cont current meds            cont tele and if more cp then upgrade to ccu and start iv ntg       Trop Neg x 3  s/p asa load  Cont asa, statin,  plavix      #DM2  lantus 14  humalog 3 tid  ssi      #HTN  lopressor 25 bid      #DVT ppx : lovenox
51M PMHx CAD s/p pci x2, DM2, HTN here with chest pain.    #Chest pain  s/p lhc with poba to prox/mid lad; lenin x2   since resolved  asa, effient  lipitor 40  appreciate cards  stable for d/c, needs outpt pmd, cards f/u one week  #DM2  lantus 14  humalog 3 tid  ssi  #HTN  lopressor 25 bid  #DVT ppx  lovenox

## 2021-02-23 NOTE — DISCHARGE NOTE PROVIDER - CARE PROVIDER_API CALL
Lester Franklin)  Cardiovascular Disease; Internal Medicine; Interventional Cardiology; Nuclear Cardiology  [address] [place] [address]  Phone: [phone]  Fax: [phone]  Follow Up Time: 2 weeks   Lester Franklin)  Cardiovascular Disease; Internal Medicine; Interventional Cardiology; Nuclear Cardiology  [address] [place] [address], [address]  [place] [address]  Phone: [phone]  Fax: [phone]  Follow Up Time: 2 weeks    Carlos Rouse)  [address] [facility]  [address] [facility], [facility] [address]  [place] [address]  Phone: [phone]  Fax: [phone]  Follow Up Time: 1 week

## 2021-02-23 NOTE — DISCHARGE NOTE PROVIDER - HOSPITAL COURSE
51 year old male  with a PMhx of CAD s/p PCIx2 (last was 10 years ago) on ASA/plavix, HTN, DMII    CC: chest pain     History goes back to: 1 week ago when patient started to have epigastric chest pain, 6/10- 8/10, radiating to his upper chest. Patient reports that pain in intermittent and exacerbated by  walking, relived by resting.   Pain is not associated with food intake, not exacerbated by heavy meals. No associated SOB with chest pain.     ROS is negative for: no fever, no chills, no nausea or vomiting. Patient denies change in urinary or bowel habits. No lower extremity edema/swelling. No anosmia or ageusia.     In the ED: VS significant BP:210/100  Labs significant for: WNL  Troponin: negative x1  EKG: no acute St changes  CXR: WNL     51 year old male  with a PMhx of CAD s/p PCIx2 (last was 10 years ago) on ASA/plavix, HTN, DMII  History goes back to: 1 week ago when patient started to have epigastric chest pain, 6/10- 8/10, radiating to his upper chest. Patient reports that pain in intermittent and exacerbated by  walking, relived by resting.   Pain is not associated with food intake, not exacerbated by heavy meals. No associated SOB with chest pain.     ROS is negative for: no fever, no chills, no nausea or vomiting. Patient denies change in urinary or bowel habits. No lower extremity edema/swelling. No anosmia or ageusia.     In the ED: VS significant BP:210/100  Labs significant for: WNL  Troponin: negative x1  EKG: no acute St changes  CXR: WNL    Successful PCI to Prox/MId LAD with balloon angioplasty and two CHINYERE SYNERGY 3 x 32 mm and SYNERGY 3.5 x 8 mm    POST-OP DIAGNOSIS  Sgnificant 2 vessel disease, unstable angina , abnormal NST AUC score 8 rec A for revascularization SYNTAX score 15    Pt to continue BB, ASA, Statin as prescribed

## 2021-02-23 NOTE — DISCHARGE NOTE PROVIDER - PROVIDER TOKENS
PROVIDER:[TOKEN:[[id number]],FOLLOWUP:[2 weeks]] PROVIDER:[TOKEN:[[id number]],FOLLOWUP:[2 weeks]],PROVIDER:[TOKEN:[[id number]],FOLLOWUP:[1 week]]

## 2021-02-23 NOTE — DISCHARGE NOTE PROVIDER - NSDCMRMEDTOKEN_GEN_ALL_CORE_FT
aspirin 81 mg oral tablet, chewable: 1 tab(s) orally once a day  atorvastatin 40 mg oral tablet: 1 tab(s) orally once a day  metFORMIN 1000 mg oral tablet:   Metoprolol Tartrate 25 mg oral tablet: 1 tab(s) orally 2 times a day  prasugrel 10 mg oral tablet: 1 tab(s) orally once a day MDD:1   aspirin 81 mg oral tablet, chewable: 1 tab(s) orally once a day  atorvastatin 40 mg oral tablet: 1 tab(s) orally once a day  metFORMIN 1000 mg oral tablet:   metoprolol tartrate 25 mg oral tablet: 1 tab(s) orally 2 times a day   aspirin 81 mg oral tablet, chewable: 1 tab(s) orally once a day  atorvastatin 40 mg oral tablet: 1 tab(s) orally once a day  metFORMIN 1000 mg oral tablet: HOLD METFORMIN FOR 48 HR AFTER CARDIAC CATH  metoprolol tartrate 25 mg oral tablet: 1 tab(s) orally 2 times a day  prasugrel 10 mg oral tablet: 1 tab(s) orally once a day

## 2021-02-23 NOTE — PROGRESS NOTE ADULT - SUBJECTIVE AND OBJECTIVE BOX
Cardiology Follow up    JOSÉ LUIS RAMESH   51y Male  PAST MEDICAL & SURGICAL HISTORY:  High cholesterol    HTN (hypertension)    DM (diabetes mellitus)    CAD (coronary artery disease)    S/P coronary artery stent placement         HPI:  51 year old male  with a PMhx of CAD s/p PCIx2 (last was 10 years ago) on ASA/plavix, HTN, DMII    CC: chest pain     History goes back to: 1 week ago when patient started to have epigastric chest pain, 6/10- 8/10, radiating to his upper chest. Patient reports that pain in intermittent and exacerbated by  walking, relived by resting.   Pain is not associated with food intake, not exacerbated by heavy meals. No associated SOB with chest pain.     ROS is negative for: no fever, no chills, no nausea or vomiting. Patient denies change in urinary or bowel habits. No lower extremity edema/swelling. No anosmia or ageusia.       Allergies    Brilinta (Short breath)    Intolerances    Patient seen and examined at bedside. No acute events overnight.  Patient without complaints. Pt ambulated without issues/symptoms  Denies CP, SOB, palpitations, or dizziness  No events on telemetry overnight    Vital Signs Last 24 Hrs  T(C): 36.1 (23 Feb 2021 05:27), Max: 36.1 (22 Feb 2021 20:56)  T(F): 97 (23 Feb 2021 05:27), Max: 97 (23 Feb 2021 05:27)  HR: 79 (23 Feb 2021 05:54) (73 - 82)  BP: 141/81 (23 Feb 2021 05:54) (99/61 - 148/83)  BP(mean): --  RR: 18 (23 Feb 2021 08:57) (18 - 18)  SpO2: 98% (23 Feb 2021 08:57) (95% - 98%)    MEDICATIONS  (STANDING):  aspirin  chewable 81 milliGRAM(s) Oral daily  atorvastatin Oral Tab/Cap - Peds 40 milliGRAM(s) Oral daily  dextrose 40% Gel 15 Gram(s) Oral once  dextrose 5%. 1000 milliLiter(s) (50 mL/Hr) IV Continuous <Continuous>  dextrose 5%. 1000 milliLiter(s) (100 mL/Hr) IV Continuous <Continuous>  dextrose 50% Injectable 25 Gram(s) IV Push once  dextrose 50% Injectable 12.5 Gram(s) IV Push once  dextrose 50% Injectable 25 Gram(s) IV Push once  glucagon  Injectable 1 milliGRAM(s) IntraMuscular once  influenza   Vaccine 0.5 milliLiter(s) IntraMuscular once  insulin glargine Injectable (LANTUS) 14 Unit(s) SubCutaneous at bedtime  insulin lispro (ADMELOG) corrective regimen sliding scale   SubCutaneous three times a day before meals  insulin lispro Injectable (ADMELOG) 6 Unit(s) SubCutaneous three times a day before meals  metoprolol tartrate 25 milliGRAM(s) Oral two times a day  pantoprazole    Tablet 40 milliGRAM(s) Oral before breakfast  prasugrel 10 milliGRAM(s) Oral daily  regadenoson Injectable 0.4 milliGRAM(s) IV Push once  sodium chloride 0.9%. 1000 milliLiter(s) (100 mL/Hr) IV Continuous <Continuous>    MEDICATIONS  (PRN):  aluminum hydroxide/magnesium hydroxide/simethicone Suspension 30 milliLiter(s) Oral every 6 hours PRN Dyspepsia    REVIEW OF SYSTEMS:          All negative except as mentioned in HPI    PHYSICAL EXAM:           CONSTITUTIONAL: Well-developed; well-nourished; in no acute distress  	SKIN: warm, dry  	HEAD: Normocephalic; atraumatic  	EYES: PERRL.  	ENT: No nasal discharge, airway clear, mucous membranes moist  	NECK: Supple; non tender.  	CARD: +S1, +S2, no murmurs, gallops, or rubs. Regular rate and rhythm    	RESP: No wheezes, rales or rhonchi. CTA B/L  	ABD: soft ntnd, + BS x 4 quadrants  	EXT: moves all extremities,  no clubbing, cyanosis or edema  	NEURO: Alert and oriented x3, no focal deficits          PSYCH: Cooperative, appropriate          VASCULAR:  + Rad / + PTs / +  DPs          EXTREMITY:              Right Radial: pressure dressing removed, access site soft, no hematoma, no pain, + pulses, no sign of infection, no numbness            ECG:   < from: 12 Lead ECG (02.23.21 @ 09:10) >  Ventricular Rate 78 BPM    Atrial Rate 78 BPM    P-R Interval 174 ms    QRS Duration 88 ms    Q-T Interval 342 ms    QTC Calculation(Bazett) 389 ms    P Axis 69 degrees    R Axis 81 degrees    T Axis 71 degrees    Diagnosis Line Normal sinus rhythm  Normal ECG    Confirmed by ZHANE WINCHESTER MD ([id number]) on 2/23/2021 10:27:26 AM                                                                                          2D ECHO:  LVEF%: 55 by nuclear    LABS:                        14.4   6.38  )-----------( 145      ( 23 Feb 2021 06:07 )             42.0     02-23    138  |  101  |  14  ----------------------------<  194<H>  3.9   |  27  |  0.7    Ca    9.0      23 Feb 2021 06:07  Mg     1.8     02-23    TPro  6.5  /  Alb  3.9  /  TBili  0.5  /  DBili  x   /  AST  15  /  ALT  21  /  AlkPhos  72  02-23    Magnesium, Serum: 1.8 mg/dL [1.8 - 2.4] (02-23-21 @ 06:07)  LIVER FUNCTIONS - ( 23 Feb 2021 06:07 )  Alb: 3.9 g/dL / Pro: 6.5 g/dL / ALK PHOS: 72 U/L / ALT: 21 U/L / AST: 15 U/L / GGT: x           A1C with Estimated Average Glucose Result: 9.3  LDL Cholesterol Calculated: 72 mg/dL     A/P:  I discussed the case with Cardiologist Dr. Mejia and recommend the following:    S/P PCI:   ACCESS: Right radial   CLOSURE: D stat     INTERVENTION  Successful PCI to Prox/MId LAD with balloon angioplasty and two CHINYERE SYNERGY 3 x 32 mm and SYNERGY 3.5 x 8 mm    POST-OP DIAGNOSIS  Significant 2 vessel disease, unstable angina , abnormal NST AUC score 8 rec A for revascularization SYNTAX score 15                      No ACEi/ARB due to marginal B/P at HS, will reevaluate as OP                    Strict glucemic control                    Hold Metformin for 48 hr after Cardiac Cath   	     Continue DAPT ( Aspirin 81 mg PO Daily and Effient 10 mg PO Daily ), B-Blocker, Statin Therapy                   Patient pharmacy called in for Effient prescription and it is 15$ per month                   Patient agreeing to take DAPT for at least one year or as directed by cardiologist                    Pt given instructions on importance of taking antiplatelet medication or risk acute stent thrombosis/death                   Post cath instructions, access site care and activity restrictions reviewed with patient                     Discussed with patient to return to hospital if experience chest pain, shortness breath, dizziness and site bleeding                   Aggressive risk factor modification, diet counseling, smoking cessation discussed with patient                       Benefits of Cardiac Rehabilitation discussed and patient instructed to follow up with Cardiologist during outpatient visit                   Can discharge patient from cardiac standpoint after ambulating without symptoms and access site wnl                                    Follow up with Cardiology Dr. Franklin in two weeks. Instructed to call and make an appointment                                       Cardiology Follow up      JOSÉ LUIS RAMESH   51y Male  PAST MEDICAL & SURGICAL HISTORY:  High cholesterol    HTN (hypertension)    DM (diabetes mellitus)    CAD (coronary artery disease)    S/P coronary artery stent placement         HPI:  51 year old male  with a PMhx of CAD s/p PCIx2 (last was 10 years ago) on ASA/plavix, HTN, DMII    CC: chest pain     History goes back to: 1 week ago when patient started to have epigastric chest pain, 6/10- 8/10, radiating to his upper chest. Patient reports that pain in intermittent and exacerbated by  walking, relived by resting.   Pain is not associated with food intake, not exacerbated by heavy meals. No associated SOB with chest pain.     ROS is negative for: no fever, no chills, no nausea or vomiting. Patient denies change in urinary or bowel habits. No lower extremity edema/swelling. No anosmia or ageusia.       Allergies    Brilinta (Short breath)    Intolerances    Patient seen and examined at bedside. No acute events overnight.  Patient without complaints. Pt ambulated without issues/symptoms  Denies CP, SOB, palpitations, or dizziness  No events on telemetry overnight    Vital Signs Last 24 Hrs  T(C): 36.1 (23 Feb 2021 05:27), Max: 36.1 (22 Feb 2021 20:56)  T(F): 97 (23 Feb 2021 05:27), Max: 97 (23 Feb 2021 05:27)  HR: 79 (23 Feb 2021 05:54) (73 - 82)  BP: 141/81 (23 Feb 2021 05:54) (99/61 - 148/83)  BP(mean): --  RR: 18 (23 Feb 2021 08:57) (18 - 18)  SpO2: 98% (23 Feb 2021 08:57) (95% - 98%)    MEDICATIONS  (STANDING):  aspirin  chewable 81 milliGRAM(s) Oral daily  atorvastatin Oral Tab/Cap - Peds 40 milliGRAM(s) Oral daily  dextrose 40% Gel 15 Gram(s) Oral once  dextrose 5%. 1000 milliLiter(s) (50 mL/Hr) IV Continuous <Continuous>  dextrose 5%. 1000 milliLiter(s) (100 mL/Hr) IV Continuous <Continuous>  dextrose 50% Injectable 25 Gram(s) IV Push once  dextrose 50% Injectable 12.5 Gram(s) IV Push once  dextrose 50% Injectable 25 Gram(s) IV Push once  glucagon  Injectable 1 milliGRAM(s) IntraMuscular once  influenza   Vaccine 0.5 milliLiter(s) IntraMuscular once  insulin glargine Injectable (LANTUS) 14 Unit(s) SubCutaneous at bedtime  insulin lispro (ADMELOG) corrective regimen sliding scale   SubCutaneous three times a day before meals  insulin lispro Injectable (ADMELOG) 6 Unit(s) SubCutaneous three times a day before meals  metoprolol tartrate 25 milliGRAM(s) Oral two times a day  pantoprazole    Tablet 40 milliGRAM(s) Oral before breakfast  prasugrel 10 milliGRAM(s) Oral daily  regadenoson Injectable 0.4 milliGRAM(s) IV Push once  sodium chloride 0.9%. 1000 milliLiter(s) (100 mL/Hr) IV Continuous <Continuous>    MEDICATIONS  (PRN):  aluminum hydroxide/magnesium hydroxide/simethicone Suspension 30 milliLiter(s) Oral every 6 hours PRN Dyspepsia    REVIEW OF SYSTEMS:          All negative except as mentioned in HPI    PHYSICAL EXAM:           CONSTITUTIONAL: Well-developed; well-nourished; in no acute distress  	SKIN: warm, dry  	HEAD: Normocephalic; atraumatic  	EYES: PERRL.  	ENT: No nasal discharge, airway clear, mucous membranes moist  	NECK: Supple; non tender.  	CARD: +S1, +S2, no murmurs, gallops, or rubs. Regular rate and rhythm    	RESP: No wheezes, rales or rhonchi. CTA B/L  	ABD: soft ntnd, + BS x 4 quadrants  	EXT: moves all extremities,  no clubbing, cyanosis or edema  	NEURO: Alert and oriented x3, no focal deficits          PSYCH: Cooperative, appropriate          VASCULAR:  + Rad / + PTs / +  DPs          EXTREMITY:              Right Radial: pressure dressing removed, access site soft, no hematoma, no pain, + pulses, no sign of infection, no numbness            ECG:   < from: 12 Lead ECG (02.23.21 @ 09:10) >  Ventricular Rate 78 BPM    Atrial Rate 78 BPM    P-R Interval 174 ms    QRS Duration 88 ms    Q-T Interval 342 ms    QTC Calculation(Bazett) 389 ms    P Axis 69 degrees    R Axis 81 degrees    T Axis 71 degrees    Diagnosis Line Normal sinus rhythm  Normal ECG    Confirmed by ZHANE WINCHESTER MD ([id number]) on 2/23/2021 10:27:26 AM                                                                                          2D ECHO:  LVEF%: 55 by nuclear    LABS:                        14.4   6.38  )-----------( 145      ( 23 Feb 2021 06:07 )             42.0     02-23    138  |  101  |  14  ----------------------------<  194<H>  3.9   |  27  |  0.7    Ca    9.0      23 Feb 2021 06:07  Mg     1.8     02-23    TPro  6.5  /  Alb  3.9  /  TBili  0.5  /  DBili  x   /  AST  15  /  ALT  21  /  AlkPhos  72  02-23    Magnesium, Serum: 1.8 mg/dL [1.8 - 2.4] (02-23-21 @ 06:07)  LIVER FUNCTIONS - ( 23 Feb 2021 06:07 )  Alb: 3.9 g/dL / Pro: 6.5 g/dL / ALK PHOS: 72 U/L / ALT: 21 U/L / AST: 15 U/L / GGT: x           A1C with Estimated Average Glucose Result: 9.3  LDL Cholesterol Calculated: 72 mg/dL     A/P:  I discussed the case with Cardiologist Dr. Mejia and recommend the following:    S/P PCI:   ACCESS: Right radial   CLOSURE: D stat     INTERVENTION  Successful PCI to Prox/MId LAD with balloon angioplasty and two CHINYERE SYNERGY 3 x 32 mm and SYNERGY 3.5 x 8 mm    POST-OP DIAGNOSIS  Significant 2 vessel disease, unstable angina , abnormal NST AUC score 8 rec A for revascularization SYNTAX score 15                      No ACEi/ARB due to marginal B/P at HS, will reevaluate as OP                    Strict glucemic control                    Hold Metformin for 48 hr after Cardiac Cath   	     Continue DAPT ( Aspirin 81 mg PO Daily and Effient 10 mg PO Daily ), B-Blocker, Statin Therapy                   Patient pharmacy called in for Effient prescription and it is 15$ per month                   Patient agreeing to take DAPT for at least one year or as directed by cardiologist                    Pt given instructions on importance of taking antiplatelet medication or risk acute stent thrombosis/death                   Post cath instructions, access site care and activity restrictions reviewed with patient                     Discussed with patient to return to hospital if experience chest pain, shortness breath, dizziness and site bleeding                   Aggressive risk factor modification, diet counseling, smoking cessation discussed with patient                       Benefits of Cardiac Rehabilitation discussed and patient instructed to follow up with Cardiologist during outpatient visit                   Can discharge patient from cardiac standpoint after ambulating without symptoms and access site wnl                                    Follow up with Cardiology Dr. Franklin in two weeks. Instructed to call and make an appointment

## 2021-02-26 PROBLEM — Z00.00 ENCOUNTER FOR PREVENTIVE HEALTH EXAMINATION: Status: ACTIVE | Noted: 2021-02-26

## 2021-02-26 PROBLEM — I25.10 ATHEROSCLEROTIC HEART DISEASE OF NATIVE CORONARY ARTERY WITHOUT ANGINA PECTORIS: Chronic | Status: ACTIVE | Noted: 2021-02-19

## 2021-02-26 PROBLEM — E11.9 TYPE 2 DIABETES MELLITUS WITHOUT COMPLICATIONS: Chronic | Status: ACTIVE | Noted: 2021-02-19

## 2021-02-26 PROBLEM — I10 ESSENTIAL (PRIMARY) HYPERTENSION: Chronic | Status: ACTIVE | Noted: 2021-02-19

## 2021-02-26 PROBLEM — E78.00 PURE HYPERCHOLESTEROLEMIA, UNSPECIFIED: Chronic | Status: ACTIVE | Noted: 2021-02-19

## 2021-03-04 DIAGNOSIS — I25.110 ATHEROSCLEROTIC HEART DISEASE OF NATIVE CORONARY ARTERY WITH UNSTABLE ANGINA PECTORIS: ICD-10-CM

## 2021-03-04 DIAGNOSIS — E78.5 HYPERLIPIDEMIA, UNSPECIFIED: ICD-10-CM

## 2021-03-04 DIAGNOSIS — Z79.82 LONG TERM (CURRENT) USE OF ASPIRIN: ICD-10-CM

## 2021-03-04 DIAGNOSIS — I10 ESSENTIAL (PRIMARY) HYPERTENSION: ICD-10-CM

## 2021-03-04 DIAGNOSIS — Z79.02 LONG TERM (CURRENT) USE OF ANTITHROMBOTICS/ANTIPLATELETS: ICD-10-CM

## 2021-03-04 DIAGNOSIS — Z79.84 LONG TERM (CURRENT) USE OF ORAL HYPOGLYCEMIC DRUGS: ICD-10-CM

## 2021-03-04 DIAGNOSIS — E11.9 TYPE 2 DIABETES MELLITUS WITHOUT COMPLICATIONS: ICD-10-CM

## 2021-03-04 DIAGNOSIS — Z95.5 PRESENCE OF CORONARY ANGIOPLASTY IMPLANT AND GRAFT: ICD-10-CM

## 2021-03-05 ENCOUNTER — APPOINTMENT (OUTPATIENT)
Dept: CARDIOLOGY | Facility: CLINIC | Age: 51
End: 2021-03-05
Payer: COMMERCIAL

## 2021-03-05 VITALS
HEART RATE: 71 BPM | SYSTOLIC BLOOD PRESSURE: 128 MMHG | BODY MASS INDEX: 30.77 KG/M2 | TEMPERATURE: 97.3 F | WEIGHT: 203 LBS | DIASTOLIC BLOOD PRESSURE: 80 MMHG | HEIGHT: 68 IN

## 2021-03-05 DIAGNOSIS — Z82.49 FAMILY HISTORY OF ISCHEMIC HEART DISEASE AND OTHER DISEASES OF THE CIRCULATORY SYSTEM: ICD-10-CM

## 2021-03-05 DIAGNOSIS — Z78.9 OTHER SPECIFIED HEALTH STATUS: ICD-10-CM

## 2021-03-05 PROCEDURE — 99072 ADDL SUPL MATRL&STAF TM PHE: CPT

## 2021-03-05 PROCEDURE — 93000 ELECTROCARDIOGRAM COMPLETE: CPT

## 2021-03-05 PROCEDURE — 99214 OFFICE O/P EST MOD 30 MIN: CPT

## 2021-03-05 RX ORDER — ASPIRIN 81 MG/1
81 TABLET, COATED ORAL DAILY
Qty: 30 | Refills: 6 | Status: ACTIVE | COMMUNITY

## 2021-03-09 NOTE — ASSESSMENT
[FreeTextEntry1] : 52 yo male with pmhx and presentation as above\par cad, s/p pci of lad with lenin with residual sign rca ds and recurrent symptoms\par htn/dm/dyslipidemia\par cont all meds\par add ntg\par set up for for lhc/pci of rca\par would not return to work until completely revascularized \par all hx records reviewed\par cont with aggressive risk modif\par diet and act as tolerated\par rtc after cath

## 2021-03-09 NOTE — PHYSICAL EXAM
[General Appearance - Well Developed] : well developed [Normal Appearance] : normal appearance [Well Groomed] : well groomed [General Appearance - Well Nourished] : well nourished [No Deformities] : no deformities [General Appearance - In No Acute Distress] : no acute distress [Normal Oral Mucosa] : normal oral mucosa [No Oral Pallor] : no oral pallor [No Oral Cyanosis] : no oral cyanosis [Normal Jugular Venous A Waves Present] : normal jugular venous A waves present [Normal Jugular Venous V Waves Present] : normal jugular venous V waves present [No Jugular Venous Clements A Waves] : no jugular venous clements A waves [Respiration, Rhythm And Depth] : normal respiratory rhythm and effort [Exaggerated Use Of Accessory Muscles For Inspiration] : no accessory muscle use [Auscultation Breath Sounds / Voice Sounds] : lungs were clear to auscultation bilaterally [Heart Rate And Rhythm] : heart rate and rhythm were normal [Heart Sounds] : normal S1 and S2 [Murmurs] : no murmurs present [Abdomen Soft] : soft [Abdomen Tenderness] : non-tender [Abdomen Mass (___ Cm)] : no abdominal mass palpated [Nail Clubbing] : no clubbing of the fingernails [Cyanosis, Localized] : no localized cyanosis [Petechial Hemorrhages (___cm)] : no petechial hemorrhages [] : no ischemic changes [Skin Color & Pigmentation] : normal skin color and pigmentation [Oriented To Time, Place, And Person] : oriented to person, place, and time

## 2021-03-09 NOTE — HISTORY OF PRESENT ILLNESS
[FreeTextEntry1] : 50 yo male with pmhx as below is here for a f/up visit\par 2/21 admitted to [facility] with ua, s/p cath - 2 v cad, pci fo lad with lenin\par post d/c course sign for recurrent angina with mild to mod effort\par no other cvs complains\par et is stable, but limited as above\par ros is otherwise as below

## 2021-03-23 ENCOUNTER — NON-APPOINTMENT (OUTPATIENT)
Age: 51
End: 2021-03-23

## 2021-03-28 ENCOUNTER — LABORATORY RESULT (OUTPATIENT)
Age: 51
End: 2021-03-28

## 2021-03-28 ENCOUNTER — OUTPATIENT (OUTPATIENT)
Dept: OUTPATIENT SERVICES | Facility: HOSPITAL | Age: 51
LOS: 1 days | Discharge: HOME | End: 2021-03-28

## 2021-03-28 DIAGNOSIS — Z95.5 PRESENCE OF CORONARY ANGIOPLASTY IMPLANT AND GRAFT: Chronic | ICD-10-CM

## 2021-03-28 DIAGNOSIS — Z11.59 ENCOUNTER FOR SCREENING FOR OTHER VIRAL DISEASES: ICD-10-CM

## 2021-03-31 ENCOUNTER — OUTPATIENT (OUTPATIENT)
Dept: OUTPATIENT SERVICES | Facility: HOSPITAL | Age: 51
LOS: 1 days | Discharge: HOME | End: 2021-03-31
Payer: COMMERCIAL

## 2021-03-31 VITALS
OXYGEN SATURATION: 98 % | SYSTOLIC BLOOD PRESSURE: 168 MMHG | HEART RATE: 76 BPM | DIASTOLIC BLOOD PRESSURE: 98 MMHG | HEIGHT: 68 IN | WEIGHT: 203.05 LBS | RESPIRATION RATE: 18 BRPM

## 2021-03-31 DIAGNOSIS — Z95.5 PRESENCE OF CORONARY ANGIOPLASTY IMPLANT AND GRAFT: Chronic | ICD-10-CM

## 2021-03-31 LAB
ANION GAP SERPL CALC-SCNC: 11 MMOL/L — SIGNIFICANT CHANGE UP (ref 7–14)
BUN SERPL-MCNC: 13 MG/DL — SIGNIFICANT CHANGE UP (ref 10–20)
CALCIUM SERPL-MCNC: 9.5 MG/DL — SIGNIFICANT CHANGE UP (ref 8.5–10.1)
CHLORIDE SERPL-SCNC: 98 MMOL/L — SIGNIFICANT CHANGE UP (ref 98–110)
CO2 SERPL-SCNC: 28 MMOL/L — SIGNIFICANT CHANGE UP (ref 17–32)
CREAT SERPL-MCNC: 0.7 MG/DL — SIGNIFICANT CHANGE UP (ref 0.7–1.5)
GLUCOSE SERPL-MCNC: 251 MG/DL — HIGH (ref 70–99)
HCT VFR BLD CALC: 41.6 % — LOW (ref 42–52)
HCT VFR BLD CALC: 45.7 % — SIGNIFICANT CHANGE UP (ref 42–52)
HGB BLD-MCNC: 14.5 G/DL — SIGNIFICANT CHANGE UP (ref 14–18)
HGB BLD-MCNC: 15.6 G/DL — SIGNIFICANT CHANGE UP (ref 14–18)
MCHC RBC-ENTMCNC: 29.7 PG — SIGNIFICANT CHANGE UP (ref 27–31)
MCHC RBC-ENTMCNC: 29.8 PG — SIGNIFICANT CHANGE UP (ref 27–31)
MCHC RBC-ENTMCNC: 34.1 G/DL — SIGNIFICANT CHANGE UP (ref 32–37)
MCHC RBC-ENTMCNC: 34.9 G/DL — SIGNIFICANT CHANGE UP (ref 32–37)
MCV RBC AUTO: 85.1 FL — SIGNIFICANT CHANGE UP (ref 80–94)
MCV RBC AUTO: 87.2 FL — SIGNIFICANT CHANGE UP (ref 80–94)
NRBC # BLD: 0 /100 WBCS — SIGNIFICANT CHANGE UP (ref 0–0)
NRBC # BLD: 0 /100 WBCS — SIGNIFICANT CHANGE UP (ref 0–0)
PLATELET # BLD AUTO: 144 K/UL — SIGNIFICANT CHANGE UP (ref 130–400)
PLATELET # BLD AUTO: 153 K/UL — SIGNIFICANT CHANGE UP (ref 130–400)
POTASSIUM SERPL-MCNC: 4.4 MMOL/L — SIGNIFICANT CHANGE UP (ref 3.5–5)
POTASSIUM SERPL-SCNC: 4.4 MMOL/L — SIGNIFICANT CHANGE UP (ref 3.5–5)
RBC # BLD: 4.89 M/UL — SIGNIFICANT CHANGE UP (ref 4.7–6.1)
RBC # BLD: 5.24 M/UL — SIGNIFICANT CHANGE UP (ref 4.7–6.1)
RBC # FLD: 12.5 % — SIGNIFICANT CHANGE UP (ref 11.5–14.5)
RBC # FLD: 12.6 % — SIGNIFICANT CHANGE UP (ref 11.5–14.5)
SODIUM SERPL-SCNC: 137 MMOL/L — SIGNIFICANT CHANGE UP (ref 135–146)
WBC # BLD: 5.78 K/UL — SIGNIFICANT CHANGE UP (ref 4.8–10.8)
WBC # BLD: 6.25 K/UL — SIGNIFICANT CHANGE UP (ref 4.8–10.8)
WBC # FLD AUTO: 5.78 K/UL — SIGNIFICANT CHANGE UP (ref 4.8–10.8)
WBC # FLD AUTO: 6.25 K/UL — SIGNIFICANT CHANGE UP (ref 4.8–10.8)

## 2021-03-31 PROCEDURE — 92928 PRQ TCAT PLMT NTRAC ST 1 LES: CPT | Mod: RC

## 2021-03-31 PROCEDURE — 93010 ELECTROCARDIOGRAM REPORT: CPT

## 2021-03-31 PROCEDURE — 93458 L HRT ARTERY/VENTRICLE ANGIO: CPT | Mod: 26,XU

## 2021-03-31 NOTE — CHART NOTE - NSCHARTNOTEFT_GEN_A_CORE
PRE-OP DIAGNOSIS: unstable angina    PROCEDURE: left heart cath, bilateral coronary angiography, angioplasty and stenting of mid RCA    Physician: Dr. Franklin  Assistant: Raheem Thakkar DO, MD    ANESTHESIA TYPE:  [  ]General Anesthesia  [ X ] Sedation  [ X ] Local/Regional    CONDITION  [  ] Critical  [  ] Serious  [  ]Fair  [ X ]Good      IV CONTRAST:     160        mL      FINDINGS    Left Heart Catheterization  LVEDP: 17 mmHg    LV Gram  Estimated EF: not performed    Coronary Angiography                                    Left Main: normal  LAD - patent stent in prox and mid LAD  Diag 1: mild disease    Left Circumflex: proximal normal, 40% distal disease  OM 1: Normal    Ramus: patent stent in proximal portion    RCA: mild ISR of proximal RCA stent, discrete calcified eccentric 80% stenosis in mid RCA, distal RCA mild disease  RPDA: mild disease    Successful angioplasty and stenting of mid RCA with Synergy 4.0 x 12 mm CHINYERE    POST-OP DIAGNOSIS  Unstable angina        PLAN OF CARE  [ X ] D/C Home today  [ ]  D/C in AM  [ ] Return to In-patient bed  [ ] Admit for observation  [ ] Return for staged procedure:  [ ] CT Surgery consult called  [X ]  Continue DAPT, B-blocker & Statin therapy PRE-OP DIAGNOSIS: unstable angina    PROCEDURE: left heart cath, bilateral coronary angiography, angioplasty and stenting of mid RCA    Physician: Dr. Franklin  Assistant: Raheem Thakkar DO, MD    ANESTHESIA TYPE:  [  ]General Anesthesia  [ X ] Sedation  [ X ] Local/Regional    CONDITION  [  ] Critical  [  ] Serious  [  ]Fair  [ X ]Good      IV CONTRAST:     160        mL      FINDINGS    Left Heart Catheterization  LVEDP: 17 mmHg    LV Gram  Estimated EF: not performed    Coronary Angiography                                    Left Main: normal  LAD - patent stent in prox and mid LAD  Diag 1: mild disease    Left Circumflex: proximal normal, 40% distal disease  OM 1: Normal    Ramus: patent stent in proximal portion    RCA: mild ISR of proximal RCA stent, discrete calcified eccentric 80% stenosis in mid RCA, distal RCA mild disease  RPDA: mild disease    Successful angioplasty and stenting of mid RCA with Synergy 4.0 x 12 mm CHINYERE    POST-OP DIAGNOSIS  Unstable angina        PLAN OF CARE  [ X ] D/C Home today  [ ]  D/C in AM  [ ] Return to In-patient bed  [ ] Admit for observation  [ ] Return for staged procedure:  [X ]  Continue DAPT, B-blocker & Statin therapy

## 2021-03-31 NOTE — PROGRESS NOTE ADULT - SUBJECTIVE AND OBJECTIVE BOX
Cardiology Follow up    JOSÉ LUIS RAMESH   51y Male  PAST MEDICAL & SURGICAL HISTORY:  CAD (coronary artery disease)    DM (diabetes mellitus)    HTN (hypertension)    High cholesterol    S/P coronary artery stent placement  CHINYERE x2 p/m LAD 2/23/21         HPI:  50 y/o male presents here today for [facility] for staged PCI to RCA d/t recurrent CP           PMHx: HTN, DM, DLD, CAD s/p MI w/ PCI x 10yrs ago and recent CHINYERE x2 to p/m LAD 2/23                     FH: + family h/o CAD in mother           PSHx: denies    Pre cath note:    indication:  [ ] STEMI                [ ] NSTEMI                 [ ] Acute coronary syndrome                     [ ]Unstable Angina   [ ] high risk  [ ] intermediate risk  [ ] low risk                     [ ] Stable Angina     non-invasive testing:                          Date:                     result: [ ] high risk  [ ] intermediate risk  [ ] low risk    Anti- Anginal medications:                    [ ] not used                       [x ] used                   [ ] not used but strong indication not to use    Ejection Fraction                   [ ] <29            [ ] 30-39%   [ ] 40-49%     [x ]>50%    CHF                   [ ] active (within last 14 days on meds   [ ] Chronic (on meds but no exacerbation)    COPD                   [ ] mild (on chronic bronchodilators)  [ ] moderate (on chronic steroid therapy)      [ ] severe (indication for home O2 or PACO2 >50)    Other risk factors:                       [x ] Previous MI                     [ ] CVA/ stroke                    [ ] carotid stent/ CEA                    [ ] PVD/PAD- (arterial aneurysm, non-palpable pulses, tortuous vessel with inability to insert catheter, infra-renal dissection, renal or subclavian artery stenosis)                    [ ] diabetic                    [ ] previous CABG                    [ ] Renal Failure         Adjusted Cath PCI Bleeding Event Risk: 1.8%    RIGHT RADIAL ARTERY EVALUATION:  HAI TEST: WNL                    (31 Mar 2021 07:58)    Allergies    Brilinta (Short breath)    Intolerances    Patient examined in bed.  Patient without complaints.   Denies CP, SOB, palpitations, or dizziness    Vital Signs   HR: 79  BP: 128/76  RR: 16  SpO2: 96% on RA    MEDICATIONS  (STANDING):    MEDICATIONS  (PRN):      REVIEW OF SYSTEMS:          All negative except as mentioned in HPI    PHYSICAL EXAM:           CONSTITUTIONAL: Well-developed; well-nourished; in no acute distress  	SKIN: warm, dry  	HEAD: Normocephalic; atraumatic  	EYES: PERRL.  	ENT: No nasal discharge, airway clear, mucous membranes moist  	NECK: Supple; non tender.  	CARD: +S1, +S2, no murmurs, gallops, or rubs. Regular rate and rhythm    	RESP: No wheezes, rales or rhonchi. CTA B/L  	ABD: soft ntnd, + BS x 4 quadrants  	EXT: moves all extremities,  no clubbing, cyanosis or edema  	NEURO: Alert and oriented x3, no focal deficits          PSYCH: Cooperative, appropriate          VASCULAR:  + Rad / + PTs / + DPs          EXTREMITY:             Right Radial: D-stat in place, access site soft, no hematoma, no pain, + pulses, no sign of infection, no numbness            ECG: pending     LABS:                        15.6   5.78  )-----------( 153      ( 31 Mar 2021 08:34 )             45.7     03-31    137  |  98  |  13  ----------------------------<  251<H>  4.4   |  28  |  0.7    Ca    9.5      31 Mar 2021 08:34    A/P:  I discussed the case with Cardiologist Dr. Franklin  and recommend the following:    S/P PCI today:  Successful angioplasty and stenting of mid RCA with Synergy 4.0 x 12 mm CHINYERE    POST-OP DIAGNOSIS  Unstable angina      	     Continue DAPT ( Aspirin 81 mg PO Daily and Effient 10mg po daily ),  B-Blocker, Statin Therapy                   Patient has been on Effient , he pays $15 per month, patient states he has enough supply at home                   NO ACEi at this time d/t marginal BP                   CBC at 17:00pm                   EKG prior to d/c 17:00pm                   NS at 150 ml/hr x 6hrs                   Monitor access site                   Patient agreeing to take DAPT for at least one year or as directed by cardiologist                    Pt given instructions on importance of taking antiplatelet medication or risk acute stent thrombosis/death                   Post cath instructions, access site care and activity restrictions reviewed with patient                     Discussed with patient to return to hospital if experience chest pain, shortness breath, dizziness and site bleeding                   Aggressive risk factor modification, diet counseling, smoking cessation discussed with patient                    Benefits of cardiac rehabilitation discussed and instructed patient to follow-up with cardiologist during outpatient visit.                      Can discharge patient 19:00pm from cardiac standpoint after ambulating without symptoms, access site wnl, labs and ECG reviewed                    Follow up with Cardiology Dr. Franklin in two weeks.  Instructed to call and make an appointment                                       Cardiology Follow up    JOSÉ LUIS RAMESH   51y Male  PAST MEDICAL & SURGICAL HISTORY:  CAD (coronary artery disease)    DM (diabetes mellitus)    HTN (hypertension)    High cholesterol    S/P coronary artery stent placement  CHINYERE x2 p/m LAD 2/23/21         HPI:  50 y/o male presents here today for [facility] for staged PCI to RCA d/t recurrent CP           PMHx: HTN, DM, DLD, CAD s/p MI w/ PCI x 10yrs ago and recent CHINYERE x2 to p/m LAD 2/23                     FH: + family h/o CAD in mother           PSHx: denies    Pre cath note:    indication:  [ ] STEMI                [ ] NSTEMI                 [ ] Acute coronary syndrome                     [ ]Unstable Angina   [ ] high risk  [ ] intermediate risk  [ ] low risk                     [ ] Stable Angina     non-invasive testing:                          Date:                     result: [ ] high risk  [ ] intermediate risk  [ ] low risk    Anti- Anginal medications:                    [ ] not used                       [x ] used                   [ ] not used but strong indication not to use    Ejection Fraction                   [ ] <29            [ ] 30-39%   [ ] 40-49%     [x ]>50%    CHF                   [ ] active (within last 14 days on meds   [ ] Chronic (on meds but no exacerbation)    COPD                   [ ] mild (on chronic bronchodilators)  [ ] moderate (on chronic steroid therapy)      [ ] severe (indication for home O2 or PACO2 >50)    Other risk factors:                       [x ] Previous MI                     [ ] CVA/ stroke                    [ ] carotid stent/ CEA                    [ ] PVD/PAD- (arterial aneurysm, non-palpable pulses, tortuous vessel with inability to insert catheter, infra-renal dissection, renal or subclavian artery stenosis)                    [ ] diabetic                    [ ] previous CABG                    [ ] Renal Failure         Adjusted Cath PCI Bleeding Event Risk: 1.8%    RIGHT RADIAL ARTERY EVALUATION:  HAI TEST: WNL                    (31 Mar 2021 07:58)    Allergies    Brilinta (Short breath)    Intolerances    Patient examined in bed.  Patient without complaints.   Denies CP, SOB, palpitations, or dizziness    Vital Signs   HR: 79  BP: 128/76  RR: 16  SpO2: 96% on RA    MEDICATIONS  (STANDING):    MEDICATIONS  (PRN):      REVIEW OF SYSTEMS:          All negative except as mentioned in HPI    PHYSICAL EXAM:           CONSTITUTIONAL: Well-developed; well-nourished; in no acute distress  	SKIN: warm, dry  	HEAD: Normocephalic; atraumatic  	EYES: PERRL.  	ENT: No nasal discharge, airway clear, mucous membranes moist  	NECK: Supple; non tender.  	CARD: +S1, +S2, no murmurs, gallops, or rubs. Regular rate and rhythm    	RESP: No wheezes, rales or rhonchi. CTA B/L  	ABD: soft ntnd, + BS x 4 quadrants  	EXT: moves all extremities,  no clubbing, cyanosis or edema  	NEURO: Alert and oriented x3, no focal deficits          PSYCH: Cooperative, appropriate          VASCULAR:  + Rad / + PTs / + DPs          EXTREMITY:             Right Radial: D-stat in place, access site soft, no hematoma, no pain, + pulses, no sign of infection, no numbness            ECG: pending     LABS:                        15.6   5.78  )-----------( 153      ( 31 Mar 2021 08:34 )             45.7     03-31    137  |  98  |  13  ----------------------------<  251<H>  4.4   |  28  |  0.7    Ca    9.5      31 Mar 2021 08:34    A/P:  I discussed the case with Cardiologist Dr. Franklin  and recommend the following:    S/P PCI today:  Successful angioplasty and stenting of mid RCA with Synergy 4.0 x 12 mm CHINYERE    POST-OP DIAGNOSIS  Unstable angina      	     Continue DAPT ( Aspirin 81 mg PO Daily and Effient 10mg po daily ),  B-Blocker, Statin Therapy                   Patient has been on Effient , he pays $15 per month, patient states he has enough supply at home                   NO ACEi at this time d/t marginal BP, will re-evaluate at outpatient                   CBC at 17:00pm                   EKG prior to d/c 17:00pm                   NS at 150 ml/hr x 6hrs                   Monitor access site                   Patient agreeing to take DAPT for at least one year or as directed by cardiologist                    Pt given instructions on importance of taking antiplatelet medication or risk acute stent thrombosis/death                   Post cath instructions, access site care and activity restrictions reviewed with patient                     Discussed with patient to return to hospital if experience chest pain, shortness breath, dizziness and site bleeding                   Aggressive risk factor modification, diet counseling, smoking cessation discussed with patient                    Benefits of cardiac rehabilitation discussed and instructed patient to follow-up with cardiologist during outpatient visit.                      Can discharge patient 19:00pm from cardiac standpoint after ambulating without symptoms, access site wnl, labs and ECG reviewed                    Follow up with Cardiology Dr. Franklin in 1 week.  Instructed to call and make an appointment

## 2021-03-31 NOTE — H&P CARDIOLOGY - HISTORY OF PRESENT ILLNESS
52 y/o male presents here today for [facility] for staged PCI to RCA d/t recurrent CP           PMHx: HTN, DM, DLD, CAD s/p MI w/ PCI x 10yrs ago and recent CHINYERE x2 to p/m LAD 2/23                     FH: + family h/o CAD in mother           PSHx: denies    Pre cath note:    indication:  [ ] STEMI                [ ] NSTEMI                 [ ] Acute coronary syndrome                     [ ]Unstable Angina   [ ] high risk  [ ] intermediate risk  [ ] low risk                     [ ] Stable Angina     non-invasive testing:                          Date:                     result: [ ] high risk  [ ] intermediate risk  [ ] low risk    Anti- Anginal medications:                    [ ] not used                       [x ] used                   [ ] not used but strong indication not to use    Ejection Fraction                   [ ] <29            [ ] 30-39%   [ ] 40-49%     [x ]>50%    CHF                   [ ] active (within last 14 days on meds   [ ] Chronic (on meds but no exacerbation)    COPD                   [ ] mild (on chronic bronchodilators)  [ ] moderate (on chronic steroid therapy)      [ ] severe (indication for home O2 or PACO2 >50)    Other risk factors:                       [x ] Previous MI                     [ ] CVA/ stroke                    [ ] carotid stent/ CEA                    [ ] PVD/PAD- (arterial aneurysm, non-palpable pulses, tortuous vessel with inability to insert catheter, infra-renal dissection, renal or subclavian artery stenosis)                    [ ] diabetic                    [ ] previous CABG                    [ ] Renal Failure         Adjusted Cath PCI Bleeding Event Risk: 1.8%    RIGHT RADIAL ARTERY EVALUATION:  HAI TEST: WNL

## 2021-04-01 LAB — GLUCOSE BLDC GLUCOMTR-MCNC: 256 MG/DL — HIGH (ref 70–99)

## 2021-04-02 ENCOUNTER — APPOINTMENT (OUTPATIENT)
Dept: CARDIOLOGY | Facility: CLINIC | Age: 51
End: 2021-04-02
Payer: COMMERCIAL

## 2021-04-02 VITALS
HEART RATE: 72 BPM | BODY MASS INDEX: 30.62 KG/M2 | DIASTOLIC BLOOD PRESSURE: 78 MMHG | TEMPERATURE: 97.8 F | SYSTOLIC BLOOD PRESSURE: 123 MMHG | WEIGHT: 202 LBS | HEIGHT: 68 IN

## 2021-04-02 PROCEDURE — 93000 ELECTROCARDIOGRAM COMPLETE: CPT

## 2021-04-02 PROCEDURE — 99214 OFFICE O/P EST MOD 30 MIN: CPT

## 2021-04-02 PROCEDURE — 99072 ADDL SUPL MATRL&STAF TM PHE: CPT

## 2021-04-02 NOTE — HISTORY OF PRESENT ILLNESS
[FreeTextEntry1] : 50 yo male with pmhx as below is here for a f/up visit\par 2/21 admitted to [facility] with ua, s/p cath - 2 v cad, pci of lad with lenin\par staged pci of rca 3/31\par post pci course unrem\par no major cvs complains\par et is stable, \par ros is otherwise as below

## 2021-04-02 NOTE — ASSESSMENT
[FreeTextEntry1] : 52 yo male with pmhx and presentation as above\par cad, s/p pci of lad with lenin with residual sign rca ds and recurrent symptoms\par htn/dm/dyslipidemia\par cont all meds\par may return to work without restrictions\par all hx records reviewed\par cont with aggressive risk modif\par diet and act as tolerated\par rtc 3-4 months

## 2021-04-08 DIAGNOSIS — Z79.84 LONG TERM (CURRENT) USE OF ORAL HYPOGLYCEMIC DRUGS: ICD-10-CM

## 2021-04-08 DIAGNOSIS — Z79.82 LONG TERM (CURRENT) USE OF ASPIRIN: ICD-10-CM

## 2021-04-08 DIAGNOSIS — E78.5 HYPERLIPIDEMIA, UNSPECIFIED: ICD-10-CM

## 2021-04-08 DIAGNOSIS — E11.9 TYPE 2 DIABETES MELLITUS WITHOUT COMPLICATIONS: ICD-10-CM

## 2021-04-08 DIAGNOSIS — I25.2 OLD MYOCARDIAL INFARCTION: ICD-10-CM

## 2021-04-08 DIAGNOSIS — I20.9 ANGINA PECTORIS, UNSPECIFIED: ICD-10-CM

## 2021-04-08 DIAGNOSIS — I10 ESSENTIAL (PRIMARY) HYPERTENSION: ICD-10-CM

## 2021-04-08 DIAGNOSIS — Z82.49 FAMILY HISTORY OF ISCHEMIC HEART DISEASE AND OTHER DISEASES OF THE CIRCULATORY SYSTEM: ICD-10-CM

## 2021-04-08 DIAGNOSIS — Z95.5 PRESENCE OF CORONARY ANGIOPLASTY IMPLANT AND GRAFT: ICD-10-CM

## 2021-04-08 DIAGNOSIS — I25.10 ATHEROSCLEROTIC HEART DISEASE OF NATIVE CORONARY ARTERY WITHOUT ANGINA PECTORIS: ICD-10-CM

## 2021-04-23 ENCOUNTER — RX RENEWAL (OUTPATIENT)
Age: 51
End: 2021-04-23

## 2021-09-14 ENCOUNTER — OUTPATIENT (OUTPATIENT)
Dept: OUTPATIENT SERVICES | Facility: HOSPITAL | Age: 51
LOS: 1 days | Discharge: HOME | End: 2021-09-14
Payer: OTHER MISCELLANEOUS

## 2021-09-14 DIAGNOSIS — Z95.5 PRESENCE OF CORONARY ANGIOPLASTY IMPLANT AND GRAFT: Chronic | ICD-10-CM

## 2021-09-14 DIAGNOSIS — M54.5 LOW BACK PAIN: ICD-10-CM

## 2021-09-14 PROCEDURE — 72110 X-RAY EXAM L-2 SPINE 4/>VWS: CPT | Mod: 26

## 2021-09-17 ENCOUNTER — APPOINTMENT (OUTPATIENT)
Dept: CARDIOLOGY | Facility: CLINIC | Age: 51
End: 2021-09-17

## 2021-11-17 ENCOUNTER — APPOINTMENT (OUTPATIENT)
Dept: CARDIOLOGY | Facility: CLINIC | Age: 51
End: 2021-11-17
Payer: COMMERCIAL

## 2021-11-17 VITALS
DIASTOLIC BLOOD PRESSURE: 84 MMHG | TEMPERATURE: 97.5 F | BODY MASS INDEX: 31.07 KG/M2 | HEART RATE: 78 BPM | SYSTOLIC BLOOD PRESSURE: 130 MMHG | WEIGHT: 205 LBS | HEIGHT: 68 IN

## 2021-11-17 PROCEDURE — 93000 ELECTROCARDIOGRAM COMPLETE: CPT

## 2021-11-17 PROCEDURE — 99214 OFFICE O/P EST MOD 30 MIN: CPT

## 2021-11-17 RX ORDER — METFORMIN HYDROCHLORIDE 1000 MG/1
1000 TABLET, COATED ORAL
Qty: 60 | Refills: 3 | Status: ACTIVE | COMMUNITY
Start: 2021-11-17

## 2021-11-17 NOTE — ASSESSMENT
[FreeTextEntry1] : 50 yo male with pmhx and presentation as above\par cad, s/p pci of lad with lenin with residual sign rca ds and recurrent symptoms\par htn/dm/dyslipidemia\par cont all meds\par pre op for epidural injections\par may be able to stop effient 1/22\par increase lipitor\par dm mx as per pmd\par cont with aggressive risk modif\par diet and act as tolerated\par rtc 3-4 months

## 2021-11-17 NOTE — HISTORY OF PRESENT ILLNESS
[FreeTextEntry1] : 50 yo male with pmhx as below is here for a f/up visit\par 2/21 admitted to [facility] with ua, s/p cath - 2 v cad, pci of lad with lenin\par staged pci of rca 3/31\par post pci course unrem\par no major cvs complains\par + back pain\par et is stable, \par ros is otherwise as below

## 2022-03-04 ENCOUNTER — APPOINTMENT (OUTPATIENT)
Dept: CARDIOLOGY | Facility: CLINIC | Age: 52
End: 2022-03-04

## 2022-04-07 ENCOUNTER — APPOINTMENT (OUTPATIENT)
Dept: NEUROSURGERY | Facility: CLINIC | Age: 52
End: 2022-04-07
Payer: OTHER MISCELLANEOUS

## 2022-04-07 PROCEDURE — 99204 OFFICE O/P NEW MOD 45 MIN: CPT

## 2022-04-07 PROCEDURE — 99072 ADDL SUPL MATRL&STAF TM PHE: CPT

## 2022-04-22 ENCOUNTER — APPOINTMENT (OUTPATIENT)
Dept: CARDIOLOGY | Facility: CLINIC | Age: 52
End: 2022-04-22

## 2022-04-30 ENCOUNTER — RX RENEWAL (OUTPATIENT)
Age: 52
End: 2022-04-30

## 2022-05-10 ENCOUNTER — APPOINTMENT (OUTPATIENT)
Dept: NEUROSURGERY | Facility: CLINIC | Age: 52
End: 2022-05-10

## 2022-06-13 ENCOUNTER — APPOINTMENT (OUTPATIENT)
Dept: NEUROLOGY | Facility: CLINIC | Age: 52
End: 2022-06-13

## 2022-06-13 ENCOUNTER — APPOINTMENT (OUTPATIENT)
Dept: NEUROSURGERY | Facility: CLINIC | Age: 52
End: 2022-06-13
Payer: OTHER MISCELLANEOUS

## 2022-06-13 PROCEDURE — 99213 OFFICE O/P EST LOW 20 MIN: CPT

## 2022-06-13 PROCEDURE — 99072 ADDL SUPL MATRL&STAF TM PHE: CPT

## 2022-06-17 ENCOUNTER — APPOINTMENT (OUTPATIENT)
Dept: CARDIOLOGY | Facility: CLINIC | Age: 52
End: 2022-06-17
Payer: MEDICAID

## 2022-06-17 VITALS
BODY MASS INDEX: 31.8 KG/M2 | HEART RATE: 78 BPM | WEIGHT: 209.8 LBS | SYSTOLIC BLOOD PRESSURE: 143 MMHG | DIASTOLIC BLOOD PRESSURE: 90 MMHG | HEIGHT: 68 IN

## 2022-06-17 PROCEDURE — 99214 OFFICE O/P EST MOD 30 MIN: CPT

## 2022-06-17 PROCEDURE — 93000 ELECTROCARDIOGRAM COMPLETE: CPT

## 2022-06-17 RX ORDER — NITROGLYCERIN 0.4 MG/1
0.4 TABLET SUBLINGUAL
Qty: 30 | Refills: 1 | Status: DISCONTINUED | COMMUNITY
Start: 2021-03-05 | End: 2022-06-17

## 2022-06-17 RX ORDER — PRASUGREL 5 MG/1
5 TABLET, FILM COATED ORAL DAILY
Qty: 90 | Refills: 3 | Status: DISCONTINUED | COMMUNITY
Start: 2022-04-30 | End: 2022-06-17

## 2022-06-17 NOTE — HISTORY OF PRESENT ILLNESS
[FreeTextEntry1] : 51 yo male with pmhx as below is here for a f/up visit\par 2/21 admitted to [facility] with ua, s/p cath - 2 v cad, pci of lad with lenin\par staged pci of rca 3/31\par no major cvs complains\par + back pain, s/p epidural injections, awaiting sx\par et is stable, \par ros is otherwise as below

## 2022-06-17 NOTE — ASSESSMENT
[FreeTextEntry1] : 51 yo male with pmhx and presentation as above\par cad, s/p pci of lad with lenin with residual sign rca ds and recurrent symptoms\par htn/dm/dyslipidemia\par cont all meds\par add arb\par decrease effient to 5\par dm mx as per pmd\par labs reviewed, a1c discussed\par cont with aggressive risk modif\par diet and act as tolerated\par rtc 4-6 months

## 2022-07-20 ENCOUNTER — OUTPATIENT (OUTPATIENT)
Dept: OUTPATIENT SERVICES | Facility: HOSPITAL | Age: 52
LOS: 1 days | Discharge: HOME | End: 2022-07-20

## 2022-07-20 ENCOUNTER — RESULT REVIEW (OUTPATIENT)
Age: 52
End: 2022-07-20

## 2022-07-20 DIAGNOSIS — Z95.5 PRESENCE OF CORONARY ANGIOPLASTY IMPLANT AND GRAFT: Chronic | ICD-10-CM

## 2022-07-20 DIAGNOSIS — M54.9 DORSALGIA, UNSPECIFIED: ICD-10-CM

## 2022-07-20 PROCEDURE — 72148 MRI LUMBAR SPINE W/O DYE: CPT | Mod: 26

## 2022-08-18 ENCOUNTER — APPOINTMENT (OUTPATIENT)
Dept: UROLOGY | Facility: CLINIC | Age: 52
End: 2022-08-18

## 2022-08-18 VITALS
HEIGHT: 68 IN | SYSTOLIC BLOOD PRESSURE: 177 MMHG | WEIGHT: 212 LBS | DIASTOLIC BLOOD PRESSURE: 117 MMHG | BODY MASS INDEX: 32.13 KG/M2 | HEART RATE: 96 BPM

## 2022-08-18 DIAGNOSIS — R33.9 RETENTION OF URINE, UNSPECIFIED: ICD-10-CM

## 2022-08-18 DIAGNOSIS — Z87.891 PERSONAL HISTORY OF NICOTINE DEPENDENCE: ICD-10-CM

## 2022-08-18 DIAGNOSIS — R35.1 NOCTURIA: ICD-10-CM

## 2022-08-18 DIAGNOSIS — R35.0 FREQUENCY OF MICTURITION: ICD-10-CM

## 2022-08-18 DIAGNOSIS — N52.9 MALE ERECTILE DYSFUNCTION, UNSPECIFIED: ICD-10-CM

## 2022-08-18 DIAGNOSIS — I25.2 OLD MYOCARDIAL INFARCTION: ICD-10-CM

## 2022-08-18 DIAGNOSIS — R39.15 URGENCY OF URINATION: ICD-10-CM

## 2022-08-18 PROCEDURE — 99204 OFFICE O/P NEW MOD 45 MIN: CPT

## 2022-08-18 NOTE — PHYSICAL EXAM
[General Appearance - Well Developed] : well developed [General Appearance - Well Nourished] : well nourished [Normal Appearance] : normal appearance [Well Groomed] : well groomed [General Appearance - In No Acute Distress] : no acute distress [Heart Rate And Rhythm] : Heart rate and rhythm were normal [] : no respiratory distress [Respiration, Rhythm And Depth] : normal respiratory rhythm and effort [Exaggerated Use Of Accessory Muscles For Inspiration] : no accessory muscle use [Abdomen Soft] : soft [Abdomen Tenderness] : non-tender [Abdomen Hernia] : no hernia was discovered [Costovertebral Angle Tenderness] : no ~M costovertebral angle tenderness [Penis Abnormality] : normal circumcised penis [Epididymis] : the epididymides were normal [Testes Tenderness] : no tenderness of the testes [Prostate Enlargement] : the prostate was not enlarged [Prostate Tenderness] : the prostate was not tender [No Prostate Nodules] : no prostate nodules [Prostate Size ___ gm] : prostate size [unfilled] gm [Normal Station and Gait] : the gait and station were normal for the patient's age [FreeTextEntry1] : DTR's & BC reflexes were intact  [Oriented To Time, Place, And Person] : oriented to person, place, and time [Affect] : the affect was normal [Mood] : the mood was normal [Not Anxious] : not anxious [Inguinal Lymph Nodes Enlarged Bilaterally] : inguinal

## 2022-08-18 NOTE — LETTER BODY
[Dear  ___] : Dear  [unfilled], [Consult Letter:] : I had the pleasure of evaluating your patient, [unfilled]. [Please see my note below.] : Please see my note below. [Consult Closing:] : Thank you very much for allowing me to participate in the care of this patient.  If you have any questions, please do not hesitate to contact me. [FreeTextEntry2] : Chaparro Robertson MD\par [address]\[place] [address]

## 2022-08-18 NOTE — HISTORY OF PRESENT ILLNESS
[Urinary Urgency] : urinary urgency [Nocturia] : nocturia [Post-Void Dribbling] : post-void dribbling [Erectile Dysfunction] : Erectile Dysfunction [FreeTextEntry1] : Angus is a 52-year-old male born January 14, 1970 who has had trouble with erections for about a year.  At this point what ever he gets is barely enough to almost get into the vagina it takes more effort than it used to and it goes limp sometimes before sometimes after he gets into the vagina.  The longest she has been able to keep going now is about 2 minutes and that with a lot of effort and he has not been able to penetrate in quite some time.  His nocturnal and morning erections are nonexistent and he has displayed that he can be brought to orgasm however is with only a somewhat hard penis.  He has been with the same woman for 20 years and they have an acceptable relationship.  She may not be is sexually stimulating as she might have been physically but they still have a good relationship but he still wants to have sex without.  He feels she is interested and he does not feel her attitude is part of the problem\par \par He has multiple medical issues including diabetes, hypertension hyperlipidemia with known coronary artery disease between 2011 and now he has gotten 5 stents to last year.  He is on a number of medications including a beta-blocker, he gets claudication when he walks and he he has a low back herniated disc since last September though his trouble with erections preceded the accident.  He has no pickwickian symptoms he has not been tried on anything for this is no major stress in his life except that his penis does not work and he has not smoked since 2003.  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  Some of that may be due to the fact that he is on open-mouth Angelica has a dry mouth so he tends to drink a lot of fluid in the evening.\par \par There is no family history of cancer and the major issue in his family is heart attacks. [Urinary Frequency] : no urinary frequency [Straining] : no straining [Weak Stream] : no weak stream [Intermittency] : no intermittency [Dysuria] : no dysuria [Hematuria - Gross] : no gross hematuria [Hematuria - Microscopic] : no microscopic hematuria [Bladder Spasm] : no bladder spasm [Abdominal Pain] : no abdominal pain

## 2022-08-18 NOTE — LETTER HEADER
[FreeTextEntry3] : Ady Meléndez M.D.\par Director Emeritus of Urology\par [facility]\par [address] [place] [address], [address]\[place] [address]

## 2022-08-18 NOTE — ASSESSMENT
[FreeTextEntry1] : He is a vasculopath with spine stents he is a diabetic under poor control as he put it food is just too appealing and his penis is not working as well as would like.\par \par The most common diagnosis here is going to be vascular and the question is is it safe to help him and if so what do we need to\par \par Going to order a full set of hormones the do not want to miss something on the need [place] criteria and assuming his hormones are normal limits [place] criteria is acceptable we will consider PDE 5 inhibitors.\par \par As far as his voiding it may be glucose urea I will have him keep a record of his intake and output, we will send urine for analysis and culture, I will get a renal and bladder ultrasound when he comes in for review we will consider a flow study

## 2022-08-19 LAB
APPEARANCE: CLEAR
BILIRUBIN URINE: NEGATIVE
BLOOD URINE: NEGATIVE
COLOR: NORMAL
GLUCOSE QUALITATIVE U: ABNORMAL
KETONES URINE: NORMAL
LEUKOCYTE ESTERASE URINE: NEGATIVE
NITRITE URINE: NEGATIVE
PH URINE: 7
PROTEIN URINE: NEGATIVE
SPECIFIC GRAVITY URINE: 1.03
UROBILINOGEN URINE: NORMAL

## 2022-08-22 ENCOUNTER — APPOINTMENT (OUTPATIENT)
Dept: NEUROSURGERY | Facility: CLINIC | Age: 52
End: 2022-08-22

## 2022-08-22 VITALS
WEIGHT: 212 LBS | BODY MASS INDEX: 32.13 KG/M2 | HEIGHT: 68 IN | SYSTOLIC BLOOD PRESSURE: 153 MMHG | DIASTOLIC BLOOD PRESSURE: 95 MMHG | HEART RATE: 94 BPM

## 2022-08-22 DIAGNOSIS — M48.061 SPINAL STENOSIS, LUMBAR REGION WITHOUT NEUROGENIC CLAUDICATION: ICD-10-CM

## 2022-08-22 LAB — BACTERIA UR CULT: NORMAL

## 2022-08-22 PROCEDURE — 99214 OFFICE O/P EST MOD 30 MIN: CPT

## 2022-08-23 NOTE — ASSESSMENT
[FreeTextEntry1] : 51 yo M with work related injury involving the lumbosacral region with associated radicular complaints due to moderate-severe foraminal stenosis as mentioned above. MR L spine reviewed, continued recommendation for left L4-5 decompressive laminectomy and left L5-S1 foraminotomy. He has a pending LEANDRA and we are hoping that surgery is to be scheduled promptly thereafter.\par \par He remains totally disabled and is out of work since his injury.\par \par Kristal Gay PA-C\par Adrian Ye MD

## 2022-08-23 NOTE — REASON FOR VISIT
[FreeTextEntry1] : Mr. Valadez is a 51 yo M who presents with reports of continued low back pain due to a WC injury 9/12/2021. Pain radiates along a left L5 distribution. We had recommended that he proceed with a left L4-5 decompressive laminectomy and left L5-S1 foraminotomy. Unfortunately, his insurance carrier failed to approve surgery over the past year and he continues to remain out of work and reports moderate-severe continued low back pain along with radicular features into the left lower extremity.\par \par Updated MR L spine had been ordered at his last encounter, study reviewed at this time. Although we acknowledge the radiology findings do we appreciate a disc space at S1-S2 and therefore we will continue to acknowledge the findings at L4-5/ L5-S1 (MRI reads as L3-4 and L4-5) L3-L4 LEVEL: There is disc bulging, posterior ligamentous hypertrophy and bilateral facet arthrosis contributing to moderate spinal canal stenosis as well as moderate left and mild right neuroforaminal narrowing.\par \par L4-L5 LEVEL: There is disc bulging as well as bilateral facet arthrosis resulting in severe spinal stenosis as well as severe bilateral neuroforaminal narrowing and impingement upon the exiting L4 nerve roots bilaterally.\par \par PHYSICAL EXAM: \par Constitutional: Well appearing, no distress\par HEENT: Normocephalic Atraumatic\par Psychiatric: Alert and oriented to all spheres, normal mood\par Pulmonary: No respiratory distress\par \par Neurologic: \par CN II-XII grossly intact\par Palpation: left lumbar paravertebral tenderness\par Strength: Full strength in all major muscle groups\par Sensation: Full sensation to light touch in all extremities\par Reflexes: \par  2+ patellar\par  2+ ankle jerk\par \par ROM limited \par \par Signs:\par SLR (+) left \par \par Gait: steady, walking w/o assistance.\par

## 2022-09-30 ENCOUNTER — APPOINTMENT (OUTPATIENT)
Dept: UROLOGY | Facility: CLINIC | Age: 52
End: 2022-09-30

## 2022-10-12 ENCOUNTER — APPOINTMENT (OUTPATIENT)
Dept: PAIN MANAGEMENT | Facility: CLINIC | Age: 52
End: 2022-10-12

## 2022-10-14 ENCOUNTER — RESULT REVIEW (OUTPATIENT)
Age: 52
End: 2022-10-14

## 2022-10-14 ENCOUNTER — OUTPATIENT (OUTPATIENT)
Dept: OUTPATIENT SERVICES | Facility: HOSPITAL | Age: 52
LOS: 1 days | Discharge: HOME | End: 2022-10-14

## 2022-10-14 ENCOUNTER — APPOINTMENT (OUTPATIENT)
Dept: CARDIOLOGY | Facility: CLINIC | Age: 52
End: 2022-10-14

## 2022-10-14 VITALS
OXYGEN SATURATION: 98 % | TEMPERATURE: 98 F | DIASTOLIC BLOOD PRESSURE: 94 MMHG | RESPIRATION RATE: 16 BRPM | HEART RATE: 83 BPM | WEIGHT: 207.01 LBS | HEIGHT: 68 IN | SYSTOLIC BLOOD PRESSURE: 160 MMHG

## 2022-10-14 VITALS
HEIGHT: 68 IN | WEIGHT: 207 LBS | DIASTOLIC BLOOD PRESSURE: 90 MMHG | SYSTOLIC BLOOD PRESSURE: 148 MMHG | BODY MASS INDEX: 31.37 KG/M2

## 2022-10-14 DIAGNOSIS — M48.061 SPINAL STENOSIS, LUMBAR REGION WITHOUT NEUROGENIC CLAUDICATION: ICD-10-CM

## 2022-10-14 DIAGNOSIS — Z95.5 PRESENCE OF CORONARY ANGIOPLASTY IMPLANT AND GRAFT: Chronic | ICD-10-CM

## 2022-10-14 DIAGNOSIS — Z01.818 ENCOUNTER FOR OTHER PREPROCEDURAL EXAMINATION: ICD-10-CM

## 2022-10-14 DIAGNOSIS — Z01.810 ENCOUNTER FOR PREPROCEDURAL CARDIOVASCULAR EXAMINATION: ICD-10-CM

## 2022-10-14 LAB
A1C WITH ESTIMATED AVERAGE GLUCOSE RESULT: 10.4 % — HIGH (ref 4–5.6)
ALBUMIN SERPL ELPH-MCNC: 4.5 G/DL — SIGNIFICANT CHANGE UP (ref 3.5–5.2)
ALP SERPL-CCNC: 97 U/L — SIGNIFICANT CHANGE UP (ref 30–115)
ALT FLD-CCNC: 24 U/L — SIGNIFICANT CHANGE UP (ref 0–41)
ANION GAP SERPL CALC-SCNC: 14 MMOL/L — SIGNIFICANT CHANGE UP (ref 7–14)
APTT BLD: 28.2 SEC — SIGNIFICANT CHANGE UP (ref 27–39.2)
AST SERPL-CCNC: 21 U/L — SIGNIFICANT CHANGE UP (ref 0–41)
BASOPHILS # BLD AUTO: 0.05 K/UL — SIGNIFICANT CHANGE UP (ref 0–0.2)
BASOPHILS NFR BLD AUTO: 0.8 % — SIGNIFICANT CHANGE UP (ref 0–1)
BILIRUB SERPL-MCNC: 0.6 MG/DL — SIGNIFICANT CHANGE UP (ref 0.2–1.2)
BLD GP AB SCN SERPL QL: SIGNIFICANT CHANGE UP
BUN SERPL-MCNC: 12 MG/DL — SIGNIFICANT CHANGE UP (ref 10–20)
CALCIUM SERPL-MCNC: 9.7 MG/DL — SIGNIFICANT CHANGE UP (ref 8.4–10.5)
CHLORIDE SERPL-SCNC: 96 MMOL/L — LOW (ref 98–110)
CO2 SERPL-SCNC: 27 MMOL/L — SIGNIFICANT CHANGE UP (ref 17–32)
CREAT SERPL-MCNC: 0.9 MG/DL — SIGNIFICANT CHANGE UP (ref 0.7–1.5)
EGFR: 101 ML/MIN/1.73M2 — SIGNIFICANT CHANGE UP
EOSINOPHIL # BLD AUTO: 0.08 K/UL — SIGNIFICANT CHANGE UP (ref 0–0.7)
EOSINOPHIL NFR BLD AUTO: 1.4 % — SIGNIFICANT CHANGE UP (ref 0–8)
ESTIMATED AVERAGE GLUCOSE: 252 MG/DL — HIGH (ref 68–114)
GLUCOSE SERPL-MCNC: 278 MG/DL — HIGH (ref 70–99)
HCT VFR BLD CALC: 46.4 % — SIGNIFICANT CHANGE UP (ref 42–52)
HGB BLD-MCNC: 16 G/DL — SIGNIFICANT CHANGE UP (ref 14–18)
IMM GRANULOCYTES NFR BLD AUTO: 0.7 % — HIGH (ref 0.1–0.3)
INR BLD: 0.82 RATIO — SIGNIFICANT CHANGE UP (ref 0.65–1.3)
LYMPHOCYTES # BLD AUTO: 1.62 K/UL — SIGNIFICANT CHANGE UP (ref 1.2–3.4)
LYMPHOCYTES # BLD AUTO: 27.4 % — SIGNIFICANT CHANGE UP (ref 20.5–51.1)
MCHC RBC-ENTMCNC: 30.2 PG — SIGNIFICANT CHANGE UP (ref 27–31)
MCHC RBC-ENTMCNC: 34.5 G/DL — SIGNIFICANT CHANGE UP (ref 32–37)
MCV RBC AUTO: 87.7 FL — SIGNIFICANT CHANGE UP (ref 80–94)
MONOCYTES # BLD AUTO: 0.56 K/UL — SIGNIFICANT CHANGE UP (ref 0.1–0.6)
MONOCYTES NFR BLD AUTO: 9.5 % — HIGH (ref 1.7–9.3)
MRSA PCR RESULT.: NEGATIVE — SIGNIFICANT CHANGE UP
NEUTROPHILS # BLD AUTO: 3.57 K/UL — SIGNIFICANT CHANGE UP (ref 1.4–6.5)
NEUTROPHILS NFR BLD AUTO: 60.2 % — SIGNIFICANT CHANGE UP (ref 42.2–75.2)
NRBC # BLD: 0 /100 WBCS — SIGNIFICANT CHANGE UP (ref 0–0)
PLATELET # BLD AUTO: 153 K/UL — SIGNIFICANT CHANGE UP (ref 130–400)
POTASSIUM SERPL-MCNC: 4.2 MMOL/L — SIGNIFICANT CHANGE UP (ref 3.5–5)
POTASSIUM SERPL-SCNC: 4.2 MMOL/L — SIGNIFICANT CHANGE UP (ref 3.5–5)
PROT SERPL-MCNC: 7.6 G/DL — SIGNIFICANT CHANGE UP (ref 6–8)
PROTHROM AB SERPL-ACNC: 9.3 SEC — LOW (ref 9.95–12.87)
RBC # BLD: 5.29 M/UL — SIGNIFICANT CHANGE UP (ref 4.7–6.1)
RBC # FLD: 12.1 % — SIGNIFICANT CHANGE UP (ref 11.5–14.5)
SODIUM SERPL-SCNC: 137 MMOL/L — SIGNIFICANT CHANGE UP (ref 135–146)
WBC # BLD: 5.92 K/UL — SIGNIFICANT CHANGE UP (ref 4.8–10.8)
WBC # FLD AUTO: 5.92 K/UL — SIGNIFICANT CHANGE UP (ref 4.8–10.8)

## 2022-10-14 PROCEDURE — 93010 ELECTROCARDIOGRAM REPORT: CPT

## 2022-10-14 PROCEDURE — 99214 OFFICE O/P EST MOD 30 MIN: CPT

## 2022-10-14 PROCEDURE — 71046 X-RAY EXAM CHEST 2 VIEWS: CPT | Mod: 26

## 2022-10-14 RX ORDER — METFORMIN HYDROCHLORIDE 850 MG/1
0 TABLET ORAL
Qty: 0 | Refills: 0 | DISCHARGE

## 2022-10-14 RX ORDER — GABAPENTIN 400 MG/1
400 CAPSULE ORAL
Refills: 0 | Status: ACTIVE | COMMUNITY

## 2022-10-14 RX ORDER — GLIMEPIRIDE 4 MG/1
4 TABLET ORAL
Qty: 30 | Refills: 0 | Status: ACTIVE | COMMUNITY
Start: 2022-10-08

## 2022-10-14 NOTE — PHYSICAL EXAM
[No Acute Distress] : no acute distress [Normal Venous Pressure] : normal venous pressure [No Carotid Bruit] : no carotid bruit [Normal S1, S2] : normal S1, S2 [No Murmur] : no murmur [No Rub] : no rub [No Gallop] : no gallop [Clear Lung Fields] : clear lung fields [Good Air Entry] : good air entry [No Respiratory Distress] : no respiratory distress  [Soft] : abdomen soft [Non Tender] : non-tender [No Masses/organomegaly] : no masses/organomegaly [Normal Bowel Sounds] : normal bowel sounds [Normal Gait] : normal gait [No Cyanosis] : no cyanosis [No Edema] : no edema [No Clubbing] : no clubbing [No Varicosities] : no varicosities [No Rash] : no rash [No Skin Lesions] : no skin lesions [Moves all extremities] : moves all extremities [No Focal Deficits] : no focal deficits [Normal Speech] : normal speech [Alert and Oriented] : alert and oriented [Normal memory] : normal memory

## 2022-10-14 NOTE — HISTORY OF PRESENT ILLNESS
[FreeTextEntry1] : 51 yo male with pmhx as below is here for a f/up visit\par 2/21 admitted to [facility] with ua, s/p cath - 2 v cad, pci of lad with lenin\par staged pci of rca 3/31\par no major cvs complains\par + back pain, s/p epidural injections, pre op for sx\par et is stable, \par bp has been labile\par ros is otherwise as below

## 2022-10-14 NOTE — H&P PST ADULT - NEUROLOGICAL
normal/cranial nerves II-XII intact/sensation intact/responds to pain/responds to verbal commands/deep reflexes intact/cranial nerves intact

## 2022-10-14 NOTE — H&P PST ADULT - REASON FOR ADMISSION
Case Type: OP   Suite: [facility]  Proceduralist: Adrian Ye  Confirmed Surgery Date Time: 10-   PAST Date Time: 10- - 6:15  Procedure: LEFT LUMBAR 4-5 DECOMPRESSIVE LAMINECTOMY, LEFT LUMBAR 5-SACRAL 1 FORAMINOTOMY  Laterality: Left  Length of Procedure: 150 Minutes  Anesthesia Type: General  Covid testing 10/17/2022 0740

## 2022-10-14 NOTE — H&P PST ADULT - NSICDXPASTMEDICALHX_GEN_ALL_CORE_FT
PAST MEDICAL HISTORY:  Back pain due to injury 09/20/2021    CAD (coronary artery disease)     DM (diabetes mellitus)     High cholesterol     HTN (hypertension)

## 2022-10-14 NOTE — H&P PST ADULT - LIVES WITH, PROFILE
significant other
Constitutional: (-) fever  Eyes/ENT: (-) visual changes   Cardiovascular: (-) chest pain, (-) syncope  Respiratory: (-) cough, (-) shortness of breath  Gastrointestinal: (-) vomiting, (-) diarrhea  Genitourinary: (-) dysuria, (-) hesitancy, (-) frequency   Musculoskeletal: (-) neck pain, (-) back pain, (+) joint pain  Integumentary: (-) rash, (-) edema  Neurological: (-) headache, (-) altered mental status  Allergic/Immunologic: (-) pruritus

## 2022-10-14 NOTE — H&P PST ADULT - NSICDXFAMILYHX_GEN_ALL_CORE_FT
FAMILY HISTORY:  Father  Still living? No  FH: emphysema, Age at diagnosis: Age Unknown    Mother  Still living? No  FH: CAD (coronary artery disease), Age at diagnosis: Age Unknown

## 2022-10-14 NOTE — H&P PST ADULT - HISTORY OF PRESENT ILLNESS
Patient c/o stubbing back  Pains radiating to left leg with numbness and tingling and pain of scale 7/10 and it partial  reliefs with gabapentin.   Patient denies any cp, sob, palpitations, fever, cough, URI, abdominal pains, N/V or  UTI. Patient le s have scattered psoriatic lesions on below knees, B/ L elbows, Biceps, abdomen and back.     As per patient exercise tolerance of 1-2 fos walks with out sob  Patient denies any s/s covid 19 and reports no contact with known positive people. Patient has appointment for repeat covid testing pre op and instructed to continue to self monitor and report any concerns to MD. Pt will continue to practice self isolation and  exposure control measures pre op  Anesthesia Alert  NO--Difficult Airway  NO--History of neck surgery or radiation  NO--Limited ROM of neck  NO--History of Malignant hyperthermia  NO--Personal or family history of Pseudocholinesterase deficiency  NO--Prior Anesthesia Complication  NO--Latex Allergy  NO--Loose teeth  NO--History of Rheumatoid Arthritis  NO--ELICEO  YES-Bleeding Risk on Prasugrel  Pt instructed to stop vitamins/supplements/herbal medications for one week prior to surgery  As per patient this is the complete medical, surgical history and medications.   JOSÉ LUIS RAMESH is a 51 yo male who presents to pretesting for the above back surgery due to work related back injury on 09/12/2021 and patient c/o stubbing back  Pains radiating to left leg with numbness and tingling and pain of scale 7/10 and it partially reliefs with gabapentin. Patient had 1x steroid injections with out relief.   MRI of Lumbar spine done on 07/20/2022 and it revealed L4 L5 level severe spinal stenosis as welll as severe B/L neuroforaminal narrowing with impinggement upon the exiting L4 nerve root.   Patient denies any cp, sob, palpitations, fever, cough, URI, abdominal pains, N/V or  UTI. Patient le s have scattered psoriatic lesions on below knees, B/ L elbows, Biceps, abdomen and back.     As per patient exercise tolerance of 1-2 fos walks with out sob  Patient denies any s/s covid 19 and reports no contact with known positive people. Patient has appointment for repeat covid testing pre op and instructed to continue to self monitor and report any concerns to MD. Pt will continue to practice self isolation and  exposure control measures pre op  Anesthesia Alert  NO--Difficult Airway  NO--History of neck surgery or radiation  NO--Limited ROM of neck  NO--History of Malignant hyperthermia  NO--Personal or family history of Pseudocholinesterase deficiency  NO--Prior Anesthesia Complication  NO--Latex Allergy  NO--Loose teeth  NO--History of Rheumatoid Arthritis  NO--ELICEO. Moderate Risk    YES-Bleeding Risk on Prasugrel  Pt instructed to stop vitamins/supplements/herbal medications for one week prior to surgery  As per patient this is the complete medical, surgical history and medications.

## 2022-10-14 NOTE — ASSESSMENT
[FreeTextEntry1] : 51 yo male with pmhx and presentation as above\par cad, s/p pci of lad with lenin with residual sign rca ds and recurrent symptoms\par htn/dm/dyslipidemia\par cont all meds\par mod risk for mace, proceed as planned\par hold effient 5 mg for 7 days, remain on asa 81\par dm mx as per pmd\par repeat labs\par cont with aggressive risk modif\par diet and act as tolerated\par rtc 4-6 months or sooner if any issues post op

## 2022-10-17 ENCOUNTER — LABORATORY RESULT (OUTPATIENT)
Age: 52
End: 2022-10-17

## 2022-10-20 ENCOUNTER — APPOINTMENT (OUTPATIENT)
Dept: NEUROSURGERY | Facility: HOSPITAL | Age: 52
End: 2022-10-20

## 2022-10-20 RX ORDER — METFORMIN HYDROCHLORIDE 850 MG/1
1 TABLET ORAL
Qty: 0 | Refills: 0 | DISCHARGE

## 2022-10-20 RX ORDER — GABAPENTIN 400 MG/1
1 CAPSULE ORAL
Qty: 0 | Refills: 0 | DISCHARGE

## 2022-10-20 RX ORDER — LOSARTAN POTASSIUM 100 MG/1
1 TABLET, FILM COATED ORAL
Qty: 0 | Refills: 0 | DISCHARGE

## 2022-10-20 RX ORDER — NITROGLYCERIN 6.5 MG
1 CAPSULE, EXTENDED RELEASE ORAL
Qty: 0 | Refills: 0 | DISCHARGE

## 2022-12-06 NOTE — DISCHARGE NOTE PROVIDER - CARE PROVIDERS DIRECT ADDRESSES
Both [facility] can accept pt; however, pt is declining [facility] at this time. She prefers to return home with her dtr and continue with [facility] [phone]. Updates to be sent to [facility]. EVERARDO order written. Eliquis price check is $0 copay for pt. [address] [place] [address] has pt's [employer]. They can deliver medication when pt is ready for dc. ,[email or web address]

## 2022-12-16 ENCOUNTER — APPOINTMENT (OUTPATIENT)
Dept: CARDIOLOGY | Facility: CLINIC | Age: 52
End: 2022-12-16

## 2023-01-31 ENCOUNTER — APPOINTMENT (OUTPATIENT)
Dept: CARDIOLOGY | Facility: CLINIC | Age: 53
End: 2023-01-31
Payer: MEDICAID

## 2023-01-31 VITALS
HEIGHT: 68 IN | BODY MASS INDEX: 31.83 KG/M2 | DIASTOLIC BLOOD PRESSURE: 90 MMHG | SYSTOLIC BLOOD PRESSURE: 146 MMHG | HEART RATE: 82 BPM | WEIGHT: 210 LBS

## 2023-01-31 PROBLEM — M54.9 DORSALGIA, UNSPECIFIED: Chronic | Status: ACTIVE | Noted: 2022-10-14

## 2023-01-31 PROCEDURE — 93000 ELECTROCARDIOGRAM COMPLETE: CPT

## 2023-01-31 PROCEDURE — 99214 OFFICE O/P EST MOD 30 MIN: CPT | Mod: 25

## 2023-01-31 NOTE — ASSESSMENT
[FreeTextEntry1] : 52 yo male with pmhx and presentation as above\par cad, s/p pci of lad and rca with lenin \par htn/dm/dyslipidemia\par cont all meds\par labs reviewed\par dm mx as per pmd, ? endo eval\par increase arb to 50, monitor bp\par cont with aggressive risk modif\par diet and act as tolerated\par rtc 4-6 months or sooner if any issues

## 2023-01-31 NOTE — HISTORY OF PRESENT ILLNESS
[FreeTextEntry1] : 52 yo male with pmhx as below is here for a f/up visit\par 2/21 admitted to [facility] with ua, s/p cath - 2 v cad, pci of lad with lenin\par staged pci of rca 3/31\par no major cvs complains\par + back pain, s/p epidural injections, opted against sx\par et is stable, \par bp has been labile\par ros is otherwise as below

## 2023-02-22 ENCOUNTER — APPOINTMENT (OUTPATIENT)
Dept: CARDIOLOGY | Facility: CLINIC | Age: 53
End: 2023-02-22

## 2023-07-10 ENCOUNTER — RX RENEWAL (OUTPATIENT)
Age: 53
End: 2023-07-10

## 2023-07-25 ENCOUNTER — APPOINTMENT (OUTPATIENT)
Dept: CARDIOLOGY | Facility: CLINIC | Age: 53
End: 2023-07-25
Payer: MEDICAID

## 2023-07-25 VITALS — DIASTOLIC BLOOD PRESSURE: 100 MMHG | SYSTOLIC BLOOD PRESSURE: 160 MMHG

## 2023-07-25 VITALS
SYSTOLIC BLOOD PRESSURE: 185 MMHG | HEIGHT: 68 IN | WEIGHT: 206 LBS | DIASTOLIC BLOOD PRESSURE: 110 MMHG | HEART RATE: 84 BPM | BODY MASS INDEX: 31.22 KG/M2

## 2023-07-25 PROCEDURE — 99214 OFFICE O/P EST MOD 30 MIN: CPT | Mod: 25

## 2023-07-25 PROCEDURE — 93000 ELECTROCARDIOGRAM COMPLETE: CPT

## 2023-07-25 RX ORDER — METOPROLOL TARTRATE 50 MG/1
50 TABLET, FILM COATED ORAL
Qty: 180 | Refills: 3 | Status: ACTIVE | COMMUNITY
Start: 1900-01-01 | End: 1900-01-01

## 2023-07-25 RX ORDER — LOSARTAN POTASSIUM 50 MG/1
50 TABLET, FILM COATED ORAL DAILY
Qty: 90 | Refills: 3 | Status: DISCONTINUED | COMMUNITY
Start: 2022-06-17 | End: 2023-07-25

## 2023-07-25 NOTE — ASSESSMENT
[FreeTextEntry1] : 52 yo male with pmhx and presentation as above\par cad, s/p pci of lad and rca with lenin \par htn/dm/dyslipidemia\par cont all meds\par stop effient\par change to arb/hct\par increase bb \par cont with aggressive risk modif\par diet and act as tolerated\par rtc 4-6 weeks

## 2023-07-25 NOTE — HISTORY OF PRESENT ILLNESS
[FreeTextEntry1] : 54 yo male with pmhx as below is here for a f/up visit\par 2/21 admitted to [facility] with ua, s/p cath - 2 v cad, pci of lad with lenin\par staged pci of rca 3/31\par no major cvs complains\par + back pain, s/p epidural injections, opted against sx\par et is stable, \par bp has been elevated\par lost his son\par ros as below

## 2023-09-08 ENCOUNTER — APPOINTMENT (OUTPATIENT)
Dept: CARDIOLOGY | Facility: CLINIC | Age: 53
End: 2023-09-08

## 2024-01-02 ENCOUNTER — RX RENEWAL (OUTPATIENT)
Age: 54
End: 2024-01-02

## 2024-04-09 ENCOUNTER — APPOINTMENT (OUTPATIENT)
Dept: CARDIOLOGY | Facility: CLINIC | Age: 54
End: 2024-04-09
Payer: MEDICAID

## 2024-04-09 VITALS
WEIGHT: 202 LBS | SYSTOLIC BLOOD PRESSURE: 138 MMHG | HEIGHT: 68 IN | HEART RATE: 81 BPM | DIASTOLIC BLOOD PRESSURE: 86 MMHG | BODY MASS INDEX: 30.62 KG/M2

## 2024-04-09 DIAGNOSIS — I10 ESSENTIAL (PRIMARY) HYPERTENSION: ICD-10-CM

## 2024-04-09 DIAGNOSIS — E78.5 HYPERLIPIDEMIA, UNSPECIFIED: ICD-10-CM

## 2024-04-09 DIAGNOSIS — I25.10 ATHEROSCLEROTIC HEART DISEASE OF NATIVE CORONARY ARTERY W/OUT ANGINA PECTORIS: ICD-10-CM

## 2024-04-09 DIAGNOSIS — E11.9 TYPE 2 DIABETES MELLITUS W/OUT COMPLICATIONS: ICD-10-CM

## 2024-04-09 DIAGNOSIS — I20.9 ANGINA PECTORIS, UNSPECIFIED: ICD-10-CM

## 2024-04-09 PROCEDURE — 99214 OFFICE O/P EST MOD 30 MIN: CPT | Mod: 25

## 2024-04-09 PROCEDURE — 93000 ELECTROCARDIOGRAM COMPLETE: CPT

## 2024-04-09 RX ORDER — PRASUGREL 5 MG/1
5 TABLET, FILM COATED ORAL DAILY
Qty: 90 | Refills: 3 | Status: DISCONTINUED | COMMUNITY
Start: 2022-06-17 | End: 2024-04-09

## 2024-04-09 RX ORDER — ALOGLIPTIN 25 MG/1
25 TABLET, FILM COATED ORAL
Refills: 0 | Status: ACTIVE | COMMUNITY

## 2024-04-09 RX ORDER — ATORVASTATIN CALCIUM 40 MG/1
40 TABLET, FILM COATED ORAL DAILY
Qty: 90 | Refills: 3 | Status: ACTIVE | COMMUNITY
Start: 1900-01-01 | End: 1900-01-01

## 2024-04-09 NOTE — HISTORY OF PRESENT ILLNESS
[FreeTextEntry1] : 55 yo male with pmhx as below is here for a f/up visit 2/21 admitted to [facility] with ua, s/p cath - 2 v cad, pci of lad with lenin staged pci of rca 3/31 no major cvs complains + back pain, s/p epidural injections, opted against sx et is stable, lost his son ros as below

## 2024-04-09 NOTE — ASSESSMENT
[FreeTextEntry1] : 55 yo male with pmhx and presentation as above cad, s/p pci of lad and rca with lnein  htn/dm/dyslipidemia cont all meds labs reviewed, lipids too low, Lipitor adjusted to 40 needs better dm mx cont with aggressive risk modif diet and act as tolerated rtc 6 m

## 2024-06-14 ENCOUNTER — RX RENEWAL (OUTPATIENT)
Age: 54
End: 2024-06-14

## 2024-06-14 RX ORDER — LOSARTAN POTASSIUM AND HYDROCHLOROTHIAZIDE 12.5; 1 MG/1; MG/1
100-12.5 TABLET ORAL
Qty: 90 | Refills: 3 | Status: ACTIVE | COMMUNITY
Start: 2023-07-25 | End: 1900-01-01

## 2024-09-05 ENCOUNTER — RX RENEWAL (OUTPATIENT)
Age: 54
End: 2024-09-05

## 2024-10-08 ENCOUNTER — APPOINTMENT (OUTPATIENT)
Dept: CARDIOLOGY | Facility: CLINIC | Age: 54
End: 2024-10-08

## 2024-12-12 ENCOUNTER — INPATIENT (INPATIENT)
Facility: HOSPITAL | Age: 54
LOS: 1 days | Discharge: HOME | DRG: 203 | End: 2024-12-14
Attending: STUDENT IN AN ORGANIZED HEALTH CARE EDUCATION/TRAINING PROGRAM | Admitting: INTERNAL MEDICINE
Payer: MEDICARE

## 2024-12-12 VITALS
HEART RATE: 61 BPM | SYSTOLIC BLOOD PRESSURE: 181 MMHG | OXYGEN SATURATION: 97 % | TEMPERATURE: 97 F | HEIGHT: 68 IN | RESPIRATION RATE: 18 BRPM | WEIGHT: 205.03 LBS | DIASTOLIC BLOOD PRESSURE: 98 MMHG

## 2024-12-12 DIAGNOSIS — Z95.5 PRESENCE OF CORONARY ANGIOPLASTY IMPLANT AND GRAFT: Chronic | ICD-10-CM

## 2024-12-12 LAB
ALBUMIN SERPL ELPH-MCNC: 4.5 G/DL — SIGNIFICANT CHANGE UP (ref 3.5–5.2)
ALP SERPL-CCNC: 68 U/L — SIGNIFICANT CHANGE UP (ref 30–115)
ALT FLD-CCNC: 11 U/L — SIGNIFICANT CHANGE UP (ref 0–41)
ANION GAP SERPL CALC-SCNC: 8 MMOL/L — SIGNIFICANT CHANGE UP (ref 7–14)
AST SERPL-CCNC: 15 U/L — SIGNIFICANT CHANGE UP (ref 0–41)
BASOPHILS # BLD AUTO: 0.03 K/UL — SIGNIFICANT CHANGE UP (ref 0–0.2)
BASOPHILS NFR BLD AUTO: 0.5 % — SIGNIFICANT CHANGE UP (ref 0–1)
BILIRUB SERPL-MCNC: 0.8 MG/DL — SIGNIFICANT CHANGE UP (ref 0.2–1.2)
BUN SERPL-MCNC: 18 MG/DL — SIGNIFICANT CHANGE UP (ref 10–20)
CALCIUM SERPL-MCNC: 9.9 MG/DL — SIGNIFICANT CHANGE UP (ref 8.4–10.5)
CHLORIDE SERPL-SCNC: 103 MMOL/L — SIGNIFICANT CHANGE UP (ref 98–110)
CO2 SERPL-SCNC: 28 MMOL/L — SIGNIFICANT CHANGE UP (ref 17–32)
CREAT SERPL-MCNC: 1.3 MG/DL — SIGNIFICANT CHANGE UP (ref 0.7–1.5)
EGFR: 64 ML/MIN/1.73M2 — SIGNIFICANT CHANGE UP
EGFR: 64 ML/MIN/1.73M2 — SIGNIFICANT CHANGE UP
EOSINOPHIL # BLD AUTO: 0.04 K/UL — SIGNIFICANT CHANGE UP (ref 0–0.7)
EOSINOPHIL NFR BLD AUTO: 0.6 % — SIGNIFICANT CHANGE UP (ref 0–8)
GLUCOSE SERPL-MCNC: 99 MG/DL — SIGNIFICANT CHANGE UP (ref 70–99)
HCT VFR BLD CALC: 43.4 % — SIGNIFICANT CHANGE UP (ref 42–52)
HGB BLD-MCNC: 14.2 G/DL — SIGNIFICANT CHANGE UP (ref 14–18)
IMM GRANULOCYTES NFR BLD AUTO: 0.6 % — HIGH (ref 0.1–0.3)
LYMPHOCYTES # BLD AUTO: 0.85 K/UL — LOW (ref 1.2–3.4)
LYMPHOCYTES # BLD AUTO: 13.3 % — LOW (ref 20.5–51.1)
MCHC RBC-ENTMCNC: 28.6 PG — SIGNIFICANT CHANGE UP (ref 27–31)
MCHC RBC-ENTMCNC: 32.7 G/DL — SIGNIFICANT CHANGE UP (ref 32–37)
MCV RBC AUTO: 87.5 FL — SIGNIFICANT CHANGE UP (ref 80–94)
MONOCYTES # BLD AUTO: 0.5 K/UL — SIGNIFICANT CHANGE UP (ref 0.1–0.6)
MONOCYTES NFR BLD AUTO: 7.8 % — SIGNIFICANT CHANGE UP (ref 1.7–9.3)
NEUTROPHILS # BLD AUTO: 4.94 K/UL — SIGNIFICANT CHANGE UP (ref 1.4–6.5)
NEUTROPHILS NFR BLD AUTO: 77.2 % — HIGH (ref 42.2–75.2)
NRBC # BLD: 0 /100 WBCS — SIGNIFICANT CHANGE UP (ref 0–0)
NRBC BLD-RTO: 0 /100 WBCS — SIGNIFICANT CHANGE UP (ref 0–0)
PLATELET # BLD AUTO: 153 K/UL — SIGNIFICANT CHANGE UP (ref 130–400)
PMV BLD: 9.3 FL — SIGNIFICANT CHANGE UP (ref 7.4–10.4)
POTASSIUM SERPL-MCNC: 4.7 MMOL/L — SIGNIFICANT CHANGE UP (ref 3.5–5)
POTASSIUM SERPL-SCNC: 4.7 MMOL/L — SIGNIFICANT CHANGE UP (ref 3.5–5)
PROT SERPL-MCNC: 7.3 G/DL — SIGNIFICANT CHANGE UP (ref 6–8)
RBC # BLD: 4.96 M/UL — SIGNIFICANT CHANGE UP (ref 4.7–6.1)
RBC # FLD: 13 % — SIGNIFICANT CHANGE UP (ref 11.5–14.5)
SODIUM SERPL-SCNC: 139 MMOL/L — SIGNIFICANT CHANGE UP (ref 135–146)
TROPONIN T, HIGH SENSITIVITY RESULT: 26 NG/L — HIGH (ref 6–21)
TROPONIN T, HIGH SENSITIVITY RESULT: 33 NG/L — HIGH (ref 6–21)
WBC # BLD: 6.4 K/UL — SIGNIFICANT CHANGE UP (ref 4.8–10.8)
WBC # FLD AUTO: 6.4 K/UL — SIGNIFICANT CHANGE UP (ref 4.8–10.8)

## 2024-12-12 PROCEDURE — 99223 1ST HOSP IP/OBS HIGH 75: CPT

## 2024-12-12 RX ORDER — IBUPROFEN 200 MG
600 TABLET ORAL ONCE
Refills: 0 | Status: COMPLETED | OUTPATIENT
Start: 2024-12-12 | End: 2024-12-12

## 2024-12-12 RX ORDER — METOPROLOL SUCCINATE 50 MG/1
50 TABLET, EXTENDED RELEASE ORAL ONCE
Refills: 0 | Status: COMPLETED | OUTPATIENT
Start: 2024-12-12 | End: 2024-12-12

## 2024-12-12 RX ORDER — REGADENOSON 0.08 MG/ML
0.4 INJECTION, SOLUTION INTRAVENOUS ONCE
Refills: 0 | Status: DISCONTINUED | OUTPATIENT
Start: 2024-12-12 | End: 2024-12-14

## 2024-12-12 RX ADMIN — METOPROLOL SUCCINATE 50 MILLIGRAM(S): 50 TABLET, EXTENDED RELEASE ORAL at 19:10

## 2024-12-12 RX ADMIN — Medication 600 MILLIGRAM(S): at 19:10

## 2024-12-13 DIAGNOSIS — R07.9 CHEST PAIN, UNSPECIFIED: ICD-10-CM

## 2024-12-13 LAB
GLUCOSE BLDC GLUCOMTR-MCNC: 145 MG/DL — HIGH (ref 70–99)
GLUCOSE BLDC GLUCOMTR-MCNC: 90 MG/DL — SIGNIFICANT CHANGE UP (ref 70–99)

## 2024-12-13 PROCEDURE — 83036 HEMOGLOBIN GLYCOSYLATED A1C: CPT

## 2024-12-13 PROCEDURE — 78452 HT MUSCLE IMAGE SPECT MULT: CPT | Mod: MC

## 2024-12-13 PROCEDURE — 99233 SBSQ HOSP IP/OBS HIGH 50: CPT

## 2024-12-13 PROCEDURE — 85610 PROTHROMBIN TIME: CPT

## 2024-12-13 PROCEDURE — 93017 CV STRESS TEST TRACING ONLY: CPT

## 2024-12-13 PROCEDURE — 93010 ELECTROCARDIOGRAM REPORT: CPT | Mod: 77

## 2024-12-13 PROCEDURE — 93307 TTE W/O DOPPLER COMPLETE: CPT

## 2024-12-13 PROCEDURE — 82962 GLUCOSE BLOOD TEST: CPT

## 2024-12-13 PROCEDURE — 80061 LIPID PANEL: CPT

## 2024-12-13 PROCEDURE — 93005 ELECTROCARDIOGRAM TRACING: CPT

## 2024-12-13 PROCEDURE — A9500: CPT

## 2024-12-13 PROCEDURE — 80053 COMPREHEN METABOLIC PANEL: CPT

## 2024-12-13 PROCEDURE — 36415 COLL VENOUS BLD VENIPUNCTURE: CPT

## 2024-12-13 PROCEDURE — 99222 1ST HOSP IP/OBS MODERATE 55: CPT

## 2024-12-13 PROCEDURE — 85025 COMPLETE CBC W/AUTO DIFF WBC: CPT

## 2024-12-13 RX ORDER — CARVEDILOL 3.12 MG/1
25 TABLET, FILM COATED ORAL EVERY 12 HOURS
Refills: 0 | Status: DISCONTINUED | OUTPATIENT
Start: 2024-12-13 | End: 2024-12-14

## 2024-12-13 RX ORDER — LABETALOL HYDROCHLORIDE 200 MG/1
10 TABLET, FILM COATED ORAL ONCE
Refills: 0 | Status: COMPLETED | OUTPATIENT
Start: 2024-12-13 | End: 2024-12-13

## 2024-12-13 RX ORDER — DEXTROSE 50 % IN WATER 50 %
25 SYRINGE (ML) INTRAVENOUS ONCE
Refills: 0 | Status: DISCONTINUED | OUTPATIENT
Start: 2024-12-13 | End: 2024-12-14

## 2024-12-13 RX ORDER — DEXTROSE 50 % IN WATER 50 %
12.5 SYRINGE (ML) INTRAVENOUS ONCE
Refills: 0 | Status: DISCONTINUED | OUTPATIENT
Start: 2024-12-13 | End: 2024-12-14

## 2024-12-13 RX ORDER — ORAL SEMAGLUTIDE 14 MG/1
0.5 TABLET ORAL
Refills: 0 | DISCHARGE

## 2024-12-13 RX ORDER — ACETAMINOPHEN 500 MG/5ML
650 LIQUID (ML) ORAL EVERY 6 HOURS
Refills: 0 | Status: DISCONTINUED | OUTPATIENT
Start: 2024-12-13 | End: 2024-12-14

## 2024-12-13 RX ORDER — ATORVASTATIN CALCIUM 80 MG/1
40 TABLET, FILM COATED ORAL DAILY
Refills: 0 | Status: DISCONTINUED | OUTPATIENT
Start: 2024-12-13 | End: 2024-12-14

## 2024-12-13 RX ORDER — GLUCAGON 3 MG/1
1 POWDER NASAL ONCE
Refills: 0 | Status: DISCONTINUED | OUTPATIENT
Start: 2024-12-13 | End: 2024-12-14

## 2024-12-13 RX ORDER — EMPAGLIFLOZIN, LINAGLIPTIN, METFORMIN HYDROCHLORIDE 5; 2.5; 1 MG/1; MG/1; MG/1
2 TABLET, EXTENDED RELEASE ORAL
Refills: 0 | DISCHARGE

## 2024-12-13 RX ORDER — ENOXAPARIN SODIUM 100 MG/ML
40 INJECTION SUBCUTANEOUS EVERY 24 HOURS
Refills: 0 | Status: DISCONTINUED | OUTPATIENT
Start: 2024-12-13 | End: 2024-12-14

## 2024-12-13 RX ORDER — SODIUM CHLORIDE 9 G/1000ML
1000 INJECTION, SOLUTION INTRAVENOUS
Refills: 0 | Status: DISCONTINUED | OUTPATIENT
Start: 2024-12-13 | End: 2024-12-14

## 2024-12-13 RX ORDER — DEXTROSE 50 % IN WATER 50 %
15 SYRINGE (ML) INTRAVENOUS ONCE
Refills: 0 | Status: DISCONTINUED | OUTPATIENT
Start: 2024-12-13 | End: 2024-12-14

## 2024-12-13 RX ORDER — INSULIN LISPRO 100 U/ML
INJECTION, SOLUTION INTRAVENOUS; SUBCUTANEOUS AT BEDTIME
Refills: 0 | Status: DISCONTINUED | OUTPATIENT
Start: 2024-12-13 | End: 2024-12-14

## 2024-12-13 RX ORDER — METOPROLOL SUCCINATE 50 MG/1
50 TABLET, EXTENDED RELEASE ORAL
Refills: 0 | Status: DISCONTINUED | OUTPATIENT
Start: 2024-12-13 | End: 2024-12-13

## 2024-12-13 RX ORDER — ASPIRIN 325 MG
81 TABLET ORAL DAILY
Refills: 0 | Status: DISCONTINUED | OUTPATIENT
Start: 2024-12-13 | End: 2024-12-14

## 2024-12-13 RX ORDER — LOSARTAN POTASSIUM 100 MG/1
50 TABLET, FILM COATED ORAL DAILY
Refills: 0 | Status: DISCONTINUED | OUTPATIENT
Start: 2024-12-13 | End: 2024-12-14

## 2024-12-13 RX ORDER — LIDOCAINE HYDROCHLORIDE 20 MG/ML
1 JELLY TOPICAL DAILY
Refills: 0 | Status: DISCONTINUED | OUTPATIENT
Start: 2024-12-13 | End: 2024-12-14

## 2024-12-13 RX ORDER — MELATONIN 5 MG
3 TABLET ORAL AT BEDTIME
Refills: 0 | Status: DISCONTINUED | OUTPATIENT
Start: 2024-12-13 | End: 2024-12-14

## 2024-12-13 RX ORDER — INSULIN LISPRO 100 U/ML
INJECTION, SOLUTION INTRAVENOUS; SUBCUTANEOUS
Refills: 0 | Status: DISCONTINUED | OUTPATIENT
Start: 2024-12-13 | End: 2024-12-14

## 2024-12-13 RX ORDER — PIOGLITAZONE HYDROCHLORIDE 15 MG/1
1 TABLET ORAL
Refills: 0 | DISCHARGE

## 2024-12-13 RX ADMIN — LOSARTAN POTASSIUM 50 MILLIGRAM(S): 100 TABLET, FILM COATED ORAL at 10:13

## 2024-12-13 RX ADMIN — ATORVASTATIN CALCIUM 40 MILLIGRAM(S): 80 TABLET, FILM COATED ORAL at 10:13

## 2024-12-13 RX ADMIN — ENOXAPARIN SODIUM 40 MILLIGRAM(S): 100 INJECTION SUBCUTANEOUS at 17:54

## 2024-12-13 RX ADMIN — Medication 81 MILLIGRAM(S): at 10:13

## 2024-12-13 RX ADMIN — LABETALOL HYDROCHLORIDE 10 MILLIGRAM(S): 200 TABLET, FILM COATED ORAL at 12:01

## 2024-12-13 RX ADMIN — INSULIN LISPRO 0: 100 INJECTION, SOLUTION INTRAVENOUS; SUBCUTANEOUS at 21:23

## 2024-12-13 RX ADMIN — CARVEDILOL 25 MILLIGRAM(S): 3.12 TABLET, FILM COATED ORAL at 17:54

## 2024-12-13 RX ADMIN — METOPROLOL SUCCINATE 50 MILLIGRAM(S): 50 TABLET, EXTENDED RELEASE ORAL at 10:13

## 2024-12-14 ENCOUNTER — TRANSCRIPTION ENCOUNTER (OUTPATIENT)
Age: 54
End: 2024-12-14

## 2024-12-14 ENCOUNTER — RESULT REVIEW (OUTPATIENT)
Age: 54
End: 2024-12-14

## 2024-12-14 VITALS — SYSTOLIC BLOOD PRESSURE: 124 MMHG | TEMPERATURE: 98 F | HEART RATE: 86 BPM | DIASTOLIC BLOOD PRESSURE: 64 MMHG

## 2024-12-14 LAB
A1C WITH ESTIMATED AVERAGE GLUCOSE RESULT: 5.4 % — SIGNIFICANT CHANGE UP (ref 4–5.6)
ALBUMIN SERPL ELPH-MCNC: 4.2 G/DL — SIGNIFICANT CHANGE UP (ref 3.5–5.2)
ALP SERPL-CCNC: 60 U/L — SIGNIFICANT CHANGE UP (ref 30–115)
ALT FLD-CCNC: 8 U/L — SIGNIFICANT CHANGE UP (ref 0–41)
ANION GAP SERPL CALC-SCNC: 11 MMOL/L — SIGNIFICANT CHANGE UP (ref 7–14)
AST SERPL-CCNC: 11 U/L — SIGNIFICANT CHANGE UP (ref 0–41)
BASOPHILS # BLD AUTO: 0.03 K/UL — SIGNIFICANT CHANGE UP (ref 0–0.2)
BASOPHILS NFR BLD AUTO: 0.5 % — SIGNIFICANT CHANGE UP (ref 0–1)
BILIRUB SERPL-MCNC: 0.5 MG/DL — SIGNIFICANT CHANGE UP (ref 0.2–1.2)
BUN SERPL-MCNC: 28 MG/DL — HIGH (ref 10–20)
CALCIUM SERPL-MCNC: 9.1 MG/DL — SIGNIFICANT CHANGE UP (ref 8.4–10.5)
CHLORIDE SERPL-SCNC: 104 MMOL/L — SIGNIFICANT CHANGE UP (ref 98–110)
CHOLEST SERPL-MCNC: 109 MG/DL — SIGNIFICANT CHANGE UP
CO2 SERPL-SCNC: 27 MMOL/L — SIGNIFICANT CHANGE UP (ref 17–32)
CREAT SERPL-MCNC: 1.5 MG/DL — SIGNIFICANT CHANGE UP (ref 0.7–1.5)
EGFR: 54 ML/MIN/1.73M2 — LOW
EGFR: 54 ML/MIN/1.73M2 — LOW
EOSINOPHIL # BLD AUTO: 0.07 K/UL — SIGNIFICANT CHANGE UP (ref 0–0.7)
EOSINOPHIL NFR BLD AUTO: 1.2 % — SIGNIFICANT CHANGE UP (ref 0–8)
ESTIMATED AVERAGE GLUCOSE: 108 MG/DL — SIGNIFICANT CHANGE UP (ref 68–114)
GLUCOSE SERPL-MCNC: 98 MG/DL — SIGNIFICANT CHANGE UP (ref 70–99)
HCT VFR BLD CALC: 37.8 % — LOW (ref 42–52)
HDLC SERPL-MCNC: 37 MG/DL — LOW
HGB BLD-MCNC: 12.4 G/DL — LOW (ref 14–18)
IMM GRANULOCYTES NFR BLD AUTO: 0.5 % — HIGH (ref 0.1–0.3)
INR BLD: 0.98 RATIO — SIGNIFICANT CHANGE UP (ref 0.65–1.3)
LDLC SERPL-MCNC: 48 MG/DL — SIGNIFICANT CHANGE UP
LIPID PNL WITH DIRECT LDL SERPL: 48 MG/DL — SIGNIFICANT CHANGE UP
LYMPHOCYTES # BLD AUTO: 1.39 K/UL — SIGNIFICANT CHANGE UP (ref 1.2–3.4)
LYMPHOCYTES # BLD AUTO: 23.6 % — SIGNIFICANT CHANGE UP (ref 20.5–51.1)
MCHC RBC-ENTMCNC: 29 PG — SIGNIFICANT CHANGE UP (ref 27–31)
MCHC RBC-ENTMCNC: 32.8 G/DL — SIGNIFICANT CHANGE UP (ref 32–37)
MCV RBC AUTO: 88.3 FL — SIGNIFICANT CHANGE UP (ref 80–94)
MONOCYTES # BLD AUTO: 0.69 K/UL — HIGH (ref 0.1–0.6)
MONOCYTES NFR BLD AUTO: 11.7 % — HIGH (ref 1.7–9.3)
NEUTROPHILS # BLD AUTO: 3.69 K/UL — SIGNIFICANT CHANGE UP (ref 1.4–6.5)
NEUTROPHILS NFR BLD AUTO: 62.5 % — SIGNIFICANT CHANGE UP (ref 42.2–75.2)
NONHDLC SERPL-MCNC: 72 MG/DL — SIGNIFICANT CHANGE UP
NRBC # BLD: 0 /100 WBCS — SIGNIFICANT CHANGE UP (ref 0–0)
NRBC BLD-RTO: 0 /100 WBCS — SIGNIFICANT CHANGE UP (ref 0–0)
PLATELET # BLD AUTO: 139 K/UL — SIGNIFICANT CHANGE UP (ref 130–400)
PMV BLD: 9.4 FL — SIGNIFICANT CHANGE UP (ref 7.4–10.4)
POTASSIUM SERPL-MCNC: 4.1 MMOL/L — SIGNIFICANT CHANGE UP (ref 3.5–5)
POTASSIUM SERPL-SCNC: 4.1 MMOL/L — SIGNIFICANT CHANGE UP (ref 3.5–5)
PROT SERPL-MCNC: 6.4 G/DL — SIGNIFICANT CHANGE UP (ref 6–8)
PROTHROM AB SERPL-ACNC: 11.6 SEC — SIGNIFICANT CHANGE UP (ref 9.95–12.87)
RBC # BLD: 4.28 M/UL — LOW (ref 4.7–6.1)
RBC # FLD: 13.2 % — SIGNIFICANT CHANGE UP (ref 11.5–14.5)
SODIUM SERPL-SCNC: 142 MMOL/L — SIGNIFICANT CHANGE UP (ref 135–146)
TRIGL SERPL-MCNC: 119 MG/DL — SIGNIFICANT CHANGE UP
WBC # BLD: 5.9 K/UL — SIGNIFICANT CHANGE UP (ref 4.8–10.8)
WBC # FLD AUTO: 5.9 K/UL — SIGNIFICANT CHANGE UP (ref 4.8–10.8)

## 2024-12-14 PROCEDURE — 78452 HT MUSCLE IMAGE SPECT MULT: CPT | Mod: 26

## 2024-12-14 PROCEDURE — 99238 HOSP IP/OBS DSCHRG MGMT 30/<: CPT

## 2024-12-14 PROCEDURE — 93018 CV STRESS TEST I&R ONLY: CPT

## 2024-12-14 PROCEDURE — 93306 TTE W/DOPPLER COMPLETE: CPT | Mod: 26

## 2024-12-14 PROCEDURE — 93016 CV STRESS TEST SUPVJ ONLY: CPT

## 2024-12-14 RX ORDER — LOSARTAN POTASSIUM 100 MG/1
50 TABLET, FILM COATED ORAL ONCE
Refills: 0 | Status: COMPLETED | OUTPATIENT
Start: 2024-12-14 | End: 2024-12-14

## 2024-12-14 RX ORDER — METOPROLOL SUCCINATE 50 MG/1
1 TABLET, EXTENDED RELEASE ORAL
Refills: 0 | DISCHARGE

## 2024-12-14 RX ORDER — LOSARTAN POTASSIUM 100 MG/1
100 TABLET, FILM COATED ORAL DAILY
Refills: 0 | Status: DISCONTINUED | OUTPATIENT
Start: 2024-12-15 | End: 2024-12-14

## 2024-12-14 RX ORDER — CARVEDILOL 3.12 MG/1
1 TABLET, FILM COATED ORAL
Qty: 90 | Refills: 0
Start: 2024-12-14

## 2024-12-14 RX ORDER — LOSARTAN POTASSIUM 100 MG/1
1 TABLET, FILM COATED ORAL
Qty: 90 | Refills: 0
Start: 2024-12-14

## 2024-12-14 RX ADMIN — CARVEDILOL 25 MILLIGRAM(S): 3.12 TABLET, FILM COATED ORAL at 05:09

## 2024-12-14 RX ADMIN — LOSARTAN POTASSIUM 50 MILLIGRAM(S): 100 TABLET, FILM COATED ORAL at 09:28

## 2024-12-14 RX ADMIN — LOSARTAN POTASSIUM 50 MILLIGRAM(S): 100 TABLET, FILM COATED ORAL at 05:09

## 2024-12-14 RX ADMIN — LIDOCAINE HYDROCHLORIDE 1 PATCH: 20 JELLY TOPICAL at 11:15

## 2024-12-14 RX ADMIN — ATORVASTATIN CALCIUM 40 MILLIGRAM(S): 80 TABLET, FILM COATED ORAL at 11:15

## 2024-12-14 RX ADMIN — Medication 40 MILLIGRAM(S): at 05:09

## 2024-12-14 RX ADMIN — Medication 100 MILLILITER(S): at 09:25

## 2024-12-14 RX ADMIN — Medication 81 MILLIGRAM(S): at 11:15

## 2024-12-14 RX ADMIN — INSULIN LISPRO 1: 100 INJECTION, SOLUTION INTRAVENOUS; SUBCUTANEOUS at 11:50

## 2024-12-16 ENCOUNTER — NON-APPOINTMENT (OUTPATIENT)
Age: 54
End: 2024-12-16

## 2024-12-19 DIAGNOSIS — Z87.891 PERSONAL HISTORY OF NICOTINE DEPENDENCE: ICD-10-CM

## 2024-12-19 DIAGNOSIS — G89.29 OTHER CHRONIC PAIN: ICD-10-CM

## 2024-12-19 DIAGNOSIS — I16.0 HYPERTENSIVE URGENCY: ICD-10-CM

## 2024-12-19 DIAGNOSIS — Z95.5 PRESENCE OF CORONARY ANGIOPLASTY IMPLANT AND GRAFT: ICD-10-CM

## 2024-12-19 DIAGNOSIS — Z79.02 LONG TERM (CURRENT) USE OF ANTITHROMBOTICS/ANTIPLATELETS: ICD-10-CM

## 2024-12-19 DIAGNOSIS — Z88.8 ALLERGY STATUS TO OTHER DRUGS, MEDICAMENTS AND BIOLOGICAL SUBSTANCES: ICD-10-CM

## 2024-12-19 DIAGNOSIS — Z79.85 LONG-TERM (CURRENT) USE OF INJECTABLE NON-INSULIN ANTIDIABETIC DRUGS: ICD-10-CM

## 2024-12-19 DIAGNOSIS — I10 ESSENTIAL (PRIMARY) HYPERTENSION: ICD-10-CM

## 2024-12-19 DIAGNOSIS — Z79.82 LONG TERM (CURRENT) USE OF ASPIRIN: ICD-10-CM

## 2024-12-19 DIAGNOSIS — E11.9 TYPE 2 DIABETES MELLITUS WITHOUT COMPLICATIONS: ICD-10-CM

## 2025-01-03 ENCOUNTER — APPOINTMENT (OUTPATIENT)
Dept: CARDIOLOGY | Facility: CLINIC | Age: 55
End: 2025-01-03
Payer: MEDICAID

## 2025-01-03 ENCOUNTER — NON-APPOINTMENT (OUTPATIENT)
Age: 55
End: 2025-01-03

## 2025-01-03 VITALS
BODY MASS INDEX: 29.55 KG/M2 | HEIGHT: 68 IN | SYSTOLIC BLOOD PRESSURE: 144 MMHG | WEIGHT: 195 LBS | HEART RATE: 94 BPM | DIASTOLIC BLOOD PRESSURE: 90 MMHG

## 2025-01-03 DIAGNOSIS — E11.9 TYPE 2 DIABETES MELLITUS W/OUT COMPLICATIONS: ICD-10-CM

## 2025-01-03 DIAGNOSIS — I25.10 ATHEROSCLEROTIC HEART DISEASE OF NATIVE CORONARY ARTERY W/OUT ANGINA PECTORIS: ICD-10-CM

## 2025-01-03 DIAGNOSIS — I10 ESSENTIAL (PRIMARY) HYPERTENSION: ICD-10-CM

## 2025-01-03 DIAGNOSIS — I20.9 ANGINA PECTORIS, UNSPECIFIED: ICD-10-CM

## 2025-01-03 DIAGNOSIS — E78.5 HYPERLIPIDEMIA, UNSPECIFIED: ICD-10-CM

## 2025-01-03 PROCEDURE — 99214 OFFICE O/P EST MOD 30 MIN: CPT | Mod: 25

## 2025-01-03 PROCEDURE — 93000 ELECTROCARDIOGRAM COMPLETE: CPT

## 2025-01-03 RX ORDER — SEMAGLUTIDE 0.68 MG/ML
INJECTION, SOLUTION SUBCUTANEOUS WEEKLY
Refills: 0 | Status: ACTIVE | COMMUNITY

## 2025-01-03 RX ORDER — CARVEDILOL 25 MG/1
25 TABLET, FILM COATED ORAL TWICE DAILY
Refills: 0 | Status: ACTIVE | COMMUNITY

## 2025-01-03 RX ORDER — CHROMIUM 200 MCG
TABLET ORAL DAILY
Refills: 0 | Status: ACTIVE | COMMUNITY

## 2025-01-03 RX ORDER — EMPAGLIFLOZIN, LINAGLIPTIN, METFORMIN HYDROCHLORIDE 12.5; 2.5; 1 MG/1; MG/1; MG/1
12.5-2.5-1 TABLET, EXTENDED RELEASE ORAL DAILY
Refills: 0 | Status: ACTIVE | COMMUNITY

## 2025-01-03 RX ORDER — PIOGLITAZONE HYDROCHLORIDE 30 MG/1
30 TABLET ORAL DAILY
Refills: 0 | Status: ACTIVE | COMMUNITY

## 2025-04-24 ENCOUNTER — RX RENEWAL (OUTPATIENT)
Age: 55
End: 2025-04-24

## 2025-05-09 ENCOUNTER — RX RENEWAL (OUTPATIENT)
Age: 55
End: 2025-05-09

## 2025-07-08 ENCOUNTER — APPOINTMENT (OUTPATIENT)
Dept: CARDIOLOGY | Facility: CLINIC | Age: 55
End: 2025-07-08
Payer: MEDICARE

## 2025-07-08 VITALS
DIASTOLIC BLOOD PRESSURE: 100 MMHG | WEIGHT: 210 LBS | BODY MASS INDEX: 31.83 KG/M2 | HEART RATE: 87 BPM | SYSTOLIC BLOOD PRESSURE: 180 MMHG | HEIGHT: 68 IN

## 2025-07-08 PROCEDURE — 93000 ELECTROCARDIOGRAM COMPLETE: CPT

## 2025-07-08 PROCEDURE — 99214 OFFICE O/P EST MOD 30 MIN: CPT

## 2025-07-08 RX ORDER — METOPROLOL TARTRATE 50 MG/1
50 TABLET ORAL DAILY
Refills: 0 | Status: DISCONTINUED | COMMUNITY
End: 2025-07-08

## 2025-07-18 RX ORDER — CARVEDILOL 12.5 MG/1
12.5 TABLET, FILM COATED ORAL TWICE DAILY
Qty: 180 | Refills: 3 | Status: DISCONTINUED | COMMUNITY
Start: 2025-07-08 | End: 2025-07-18